# Patient Record
Sex: MALE | Race: BLACK OR AFRICAN AMERICAN | NOT HISPANIC OR LATINO | Employment: OTHER | URBAN - NONMETROPOLITAN AREA
[De-identification: names, ages, dates, MRNs, and addresses within clinical notes are randomized per-mention and may not be internally consistent; named-entity substitution may affect disease eponyms.]

---

## 2023-04-06 DIAGNOSIS — I10 BENIGN ESSENTIAL HYPERTENSION: ICD-10-CM

## 2023-04-06 DIAGNOSIS — E78.5 HYPERLIPIDEMIA, UNSPECIFIED HYPERLIPIDEMIA TYPE: ICD-10-CM

## 2023-04-06 PROBLEM — N32.89 BLADDER WALL THICKENING: Status: ACTIVE | Noted: 2023-04-06

## 2023-04-06 PROBLEM — J44.9 COPD (CHRONIC OBSTRUCTIVE PULMONARY DISEASE) (MULTI): Status: ACTIVE | Noted: 2023-04-06

## 2023-04-06 PROBLEM — M25.562 LEFT KNEE PAIN: Status: ACTIVE | Noted: 2023-04-06

## 2023-04-06 PROBLEM — R10.30 GROIN PAIN: Status: ACTIVE | Noted: 2023-04-06

## 2023-04-06 PROBLEM — G47.00 INSOMNIA: Status: ACTIVE | Noted: 2023-04-06

## 2023-04-06 PROBLEM — D17.79 EPIDURAL LIPOMATOSIS: Status: ACTIVE | Noted: 2023-04-06

## 2023-04-06 PROBLEM — R00.2 PALPITATIONS: Status: ACTIVE | Noted: 2023-04-06

## 2023-04-06 PROBLEM — N18.30 CKD (CHRONIC KIDNEY DISEASE) STAGE 3, GFR 30-59 ML/MIN (MULTI): Status: ACTIVE | Noted: 2023-04-06

## 2023-04-06 PROBLEM — M43.10 ACQUIRED SPONDYLOLISTHESIS: Status: ACTIVE | Noted: 2023-04-06

## 2023-04-06 PROBLEM — N13.8 BPH WITH OBSTRUCTION/LOWER URINARY TRACT SYMPTOMS: Status: ACTIVE | Noted: 2023-04-06

## 2023-04-06 PROBLEM — M17.9 OSTEOARTHRITIS OF KNEE: Status: ACTIVE | Noted: 2023-04-06

## 2023-04-06 PROBLEM — E04.1 THYROID NODULE: Status: ACTIVE | Noted: 2023-04-06

## 2023-04-06 PROBLEM — M54.50 LOW BACK PAIN: Status: ACTIVE | Noted: 2023-04-06

## 2023-04-06 PROBLEM — R10.2 PELVIC PAIN IN MALE: Status: ACTIVE | Noted: 2023-04-06

## 2023-04-06 PROBLEM — R32 URINARY INCONTINENCE: Status: ACTIVE | Noted: 2023-04-06

## 2023-04-06 PROBLEM — H47.013 ISCHEMIC OPTIC NEUROPATHY, BILATERAL: Status: ACTIVE | Noted: 2023-04-06

## 2023-04-06 PROBLEM — H04.123 BILATERAL DRY EYES: Status: ACTIVE | Noted: 2023-04-06

## 2023-04-06 PROBLEM — E88.2 EPIDURAL LIPOMATOSIS: Status: ACTIVE | Noted: 2023-04-06

## 2023-04-06 PROBLEM — I25.10 ATHEROSCLEROTIC HEART DISEASE OF NATIVE CORONARY ARTERY WITHOUT ANGINA PECTORIS: Status: ACTIVE | Noted: 2023-04-06

## 2023-04-06 PROBLEM — R19.4 CHANGE IN BOWEL HABITS: Status: ACTIVE | Noted: 2023-04-06

## 2023-04-06 PROBLEM — R91.1 LUNG NODULE: Status: ACTIVE | Noted: 2023-04-06

## 2023-04-06 PROBLEM — R53.81 MALAISE: Status: ACTIVE | Noted: 2023-04-06

## 2023-04-06 PROBLEM — M25.519 SHOULDER PAIN: Status: ACTIVE | Noted: 2023-04-06

## 2023-04-06 PROBLEM — C34.90 LUNG CANCER (MULTI): Status: ACTIVE | Noted: 2023-04-06

## 2023-04-06 PROBLEM — R06.83 SNORING: Status: ACTIVE | Noted: 2023-04-06

## 2023-04-06 PROBLEM — M51.36 DEGENERATIVE DISC DISEASE, LUMBAR: Status: ACTIVE | Noted: 2023-04-06

## 2023-04-06 PROBLEM — N52.9 ED (ERECTILE DYSFUNCTION): Status: ACTIVE | Noted: 2023-04-06

## 2023-04-06 PROBLEM — N39.41 URGE INCONTINENCE OF URINE: Status: ACTIVE | Noted: 2023-04-06

## 2023-04-06 PROBLEM — K59.09 CHRONIC CONSTIPATION: Status: ACTIVE | Noted: 2023-04-06

## 2023-04-06 PROBLEM — R73.01 IMPAIRED FASTING GLUCOSE: Status: ACTIVE | Noted: 2023-04-06

## 2023-04-06 PROBLEM — R31.9 HEMATURIA: Status: ACTIVE | Noted: 2023-04-06

## 2023-04-06 PROBLEM — G47.33 OBSTRUCTIVE SLEEP APNEA OF ADULT: Status: ACTIVE | Noted: 2023-04-06

## 2023-04-06 PROBLEM — M70.22 OLECRANON BURSITIS OF LEFT ELBOW: Status: ACTIVE | Noted: 2023-04-06

## 2023-04-06 PROBLEM — M25.511 PAIN IN JOINT OF RIGHT SHOULDER: Status: ACTIVE | Noted: 2023-04-06

## 2023-04-06 PROBLEM — N18.32 CHRONIC KIDNEY DISEASE, STAGE 3B (MULTI): Status: ACTIVE | Noted: 2023-04-06

## 2023-04-06 PROBLEM — R97.20 ELEVATED PSA: Status: ACTIVE | Noted: 2023-04-06

## 2023-04-06 PROBLEM — G47.30 SLEEP APNEA: Status: ACTIVE | Noted: 2023-04-06

## 2023-04-06 PROBLEM — G47.10 HYPERSOMNIA WITH SLEEP APNEA: Status: ACTIVE | Noted: 2023-04-06

## 2023-04-06 PROBLEM — K21.9 ESOPHAGEAL REFLUX: Status: ACTIVE | Noted: 2023-04-06

## 2023-04-06 PROBLEM — H25.13 NUCLEAR SCLEROSIS OF BOTH EYES: Status: ACTIVE | Noted: 2023-04-06

## 2023-04-06 PROBLEM — R39.9 URINARY SYMPTOM OR SIGN: Status: ACTIVE | Noted: 2023-04-06

## 2023-04-06 PROBLEM — R35.0 URINARY FREQUENCY: Status: ACTIVE | Noted: 2023-04-06

## 2023-04-06 PROBLEM — M51.369 DEGENERATIVE DISC DISEASE, LUMBAR: Status: ACTIVE | Noted: 2023-04-06

## 2023-04-06 PROBLEM — M17.12 PRIMARY OSTEOARTHRITIS OF LEFT KNEE: Status: ACTIVE | Noted: 2023-04-06

## 2023-04-06 PROBLEM — H40.10X3: Status: ACTIVE | Noted: 2023-04-06

## 2023-04-06 PROBLEM — N50.82 SCROTAL PAIN: Status: ACTIVE | Noted: 2023-04-06

## 2023-04-06 PROBLEM — M54.16 LUMBAR RADICULAR PAIN: Status: ACTIVE | Noted: 2023-04-06

## 2023-04-06 PROBLEM — K63.5 BENIGN COLONIC POLYP: Status: ACTIVE | Noted: 2023-04-06

## 2023-04-06 PROBLEM — N40.1 BPH WITH OBSTRUCTION/LOWER URINARY TRACT SYMPTOMS: Status: ACTIVE | Noted: 2023-04-06

## 2023-04-06 PROBLEM — M48.061 LUMBAR SPINAL STENOSIS: Status: ACTIVE | Noted: 2023-04-06

## 2023-04-06 PROBLEM — G47.30 HYPERSOMNIA WITH SLEEP APNEA: Status: ACTIVE | Noted: 2023-04-06

## 2023-04-06 RX ORDER — ATORVASTATIN CALCIUM 80 MG/1
TABLET, FILM COATED ORAL
Qty: 90 TABLET | Refills: 3 | Status: SHIPPED | OUTPATIENT
Start: 2023-04-06 | End: 2023-07-05

## 2023-04-06 RX ORDER — NEBIVOLOL 10 MG/1
TABLET ORAL
Qty: 90 TABLET | Refills: 1 | Status: SHIPPED | OUTPATIENT
Start: 2023-04-06 | End: 2023-08-19 | Stop reason: SDUPTHER

## 2023-07-11 DIAGNOSIS — I10 BENIGN ESSENTIAL HYPERTENSION: ICD-10-CM

## 2023-07-18 LAB
ALBUMIN (G/DL) IN SER/PLAS: 4.1 G/DL (ref 3.4–5)
ANION GAP IN SER/PLAS: 16 MMOL/L (ref 10–20)
APPEARANCE, URINE: CLEAR
BILIRUBIN, URINE: NEGATIVE
BLOOD, URINE: NEGATIVE
CALCIUM (MG/DL) IN SER/PLAS: 9 MG/DL (ref 8.6–10.6)
CARBON DIOXIDE, TOTAL (MMOL/L) IN SER/PLAS: 27 MMOL/L (ref 21–32)
CHLORIDE (MMOL/L) IN SER/PLAS: 103 MMOL/L (ref 98–107)
COLOR, URINE: YELLOW
CREATININE (MG/DL) IN SER/PLAS: 1.91 MG/DL (ref 0.5–1.3)
GFR MALE: 35 ML/MIN/1.73M2
GLUCOSE (MG/DL) IN SER/PLAS: 103 MG/DL (ref 74–99)
GLUCOSE, URINE: NEGATIVE MG/DL
KETONES, URINE: NEGATIVE MG/DL
LEUKOCYTE ESTERASE, URINE: NEGATIVE
NITRITE, URINE: NEGATIVE
PH, URINE: 7 (ref 5–8)
POTASSIUM (MMOL/L) IN SER/PLAS: 4 MMOL/L (ref 3.5–5.3)
PROTEIN, URINE: NEGATIVE MG/DL
SODIUM (MMOL/L) IN SER/PLAS: 142 MMOL/L (ref 136–145)
SPECIFIC GRAVITY, URINE: 1.01 (ref 1–1.03)
URATE (MG/DL) IN SER/PLAS: 8.3 MG/DL (ref 4–7.5)
UREA NITROGEN (MG/DL) IN SER/PLAS: 19 MG/DL (ref 6–23)
UROBILINOGEN, URINE: <2 MG/DL (ref 0–1.9)

## 2023-08-19 RX ORDER — NEBIVOLOL 10 MG/1
TABLET ORAL
Qty: 30 TABLET | Refills: 0 | Status: SHIPPED | OUTPATIENT
Start: 2023-08-19 | End: 2023-08-28

## 2023-08-21 DIAGNOSIS — I10 BENIGN ESSENTIAL HYPERTENSION: ICD-10-CM

## 2023-08-28 RX ORDER — NEBIVOLOL 10 MG/1
TABLET ORAL
Qty: 90 TABLET | Refills: 1 | Status: SHIPPED | OUTPATIENT
Start: 2023-08-28 | End: 2023-11-26

## 2023-10-06 ENCOUNTER — TELEPHONE (OUTPATIENT)
Dept: GASTROENTEROLOGY | Facility: HOSPITAL | Age: 78
End: 2023-10-06
Payer: COMMERCIAL

## 2023-10-25 PROBLEM — E79.0 HYPERURICEMIA: Status: ACTIVE | Noted: 2023-10-25

## 2023-10-25 RX ORDER — FLUTICASONE FUROATE, UMECLIDINIUM BROMIDE AND VILANTEROL TRIFENATATE 100; 62.5; 25 UG/1; UG/1; UG/1
1 POWDER RESPIRATORY (INHALATION) DAILY
COMMUNITY
Start: 2020-06-03

## 2023-10-25 RX ORDER — HYDROCHLOROTHIAZIDE 12.5 MG/1
1 TABLET ORAL DAILY
COMMUNITY

## 2023-10-25 RX ORDER — VIBEGRON 75 MG/1
1 TABLET, FILM COATED ORAL DAILY
COMMUNITY

## 2023-10-25 RX ORDER — FLUTICASONE PROPIONATE 50 MCG
SPRAY, SUSPENSION (ML) NASAL
COMMUNITY
Start: 2023-06-19

## 2023-10-25 RX ORDER — ASPIRIN 81 MG/1
1 TABLET ORAL DAILY
COMMUNITY

## 2023-10-25 RX ORDER — EZETIMIBE 10 MG/1
1 TABLET ORAL DAILY
COMMUNITY
Start: 2021-05-24

## 2023-10-25 RX ORDER — CYCLOBENZAPRINE HCL 10 MG
TABLET ORAL
COMMUNITY
Start: 2022-11-10

## 2023-10-25 RX ORDER — TAMSULOSIN HYDROCHLORIDE 0.4 MG/1
CAPSULE ORAL
COMMUNITY
Start: 2023-01-09

## 2023-10-25 RX ORDER — ALLOPURINOL 100 MG/1
1 TABLET ORAL DAILY
COMMUNITY
Start: 2023-07-26

## 2023-10-25 RX ORDER — LOSARTAN POTASSIUM 25 MG/1
TABLET ORAL
COMMUNITY
Start: 2023-04-11

## 2023-10-25 RX ORDER — OMEPRAZOLE 40 MG/1
1 CAPSULE, DELAYED RELEASE ORAL 2 TIMES DAILY
COMMUNITY
Start: 2013-09-17

## 2023-10-25 RX ORDER — ESOMEPRAZOLE MAGNESIUM 40 MG/1
CAPSULE, DELAYED RELEASE ORAL
COMMUNITY

## 2023-10-25 RX ORDER — DICYCLOMINE HYDROCHLORIDE 10 MG/1
CAPSULE ORAL
COMMUNITY
Start: 2023-06-09

## 2023-10-25 RX ORDER — LINACLOTIDE 145 UG/1
CAPSULE, GELATIN COATED ORAL
COMMUNITY
Start: 2023-09-16

## 2023-10-26 ENCOUNTER — HOSPITAL ENCOUNTER (OUTPATIENT)
Dept: RADIOLOGY | Facility: HOSPITAL | Age: 78
Discharge: HOME | End: 2023-10-26
Payer: MEDICARE

## 2023-10-26 ENCOUNTER — OFFICE VISIT (OUTPATIENT)
Dept: SURGERY | Facility: HOSPITAL | Age: 78
End: 2023-10-26
Payer: MEDICARE

## 2023-10-26 VITALS
RESPIRATION RATE: 17 BRPM | OXYGEN SATURATION: 98 % | HEART RATE: 65 BPM | TEMPERATURE: 97.5 F | DIASTOLIC BLOOD PRESSURE: 75 MMHG | SYSTOLIC BLOOD PRESSURE: 124 MMHG

## 2023-10-26 DIAGNOSIS — J43.9 PULMONARY EMPHYSEMA, UNSPECIFIED EMPHYSEMA TYPE (MULTI): Primary | ICD-10-CM

## 2023-10-26 DIAGNOSIS — C34.90 MALIGNANT NEOPLASM OF LUNG, UNSPECIFIED LATERALITY, UNSPECIFIED PART OF LUNG (MULTI): ICD-10-CM

## 2023-10-26 DIAGNOSIS — R91.1 LUNG NODULE: ICD-10-CM

## 2023-10-26 DIAGNOSIS — C34.90 MALIGNANT NEOPLASM OF UNSPECIFIED PART OF UNSPECIFIED BRONCHUS OR LUNG (MULTI): ICD-10-CM

## 2023-10-26 DIAGNOSIS — R91.1 SOLITARY PULMONARY NODULE: ICD-10-CM

## 2023-10-26 PROCEDURE — 1160F RVW MEDS BY RX/DR IN RCRD: CPT | Performed by: STUDENT IN AN ORGANIZED HEALTH CARE EDUCATION/TRAINING PROGRAM

## 2023-10-26 PROCEDURE — 3078F DIAST BP <80 MM HG: CPT | Performed by: STUDENT IN AN ORGANIZED HEALTH CARE EDUCATION/TRAINING PROGRAM

## 2023-10-26 PROCEDURE — 1159F MED LIST DOCD IN RCRD: CPT | Performed by: STUDENT IN AN ORGANIZED HEALTH CARE EDUCATION/TRAINING PROGRAM

## 2023-10-26 PROCEDURE — 99214 OFFICE O/P EST MOD 30 MIN: CPT | Performed by: STUDENT IN AN ORGANIZED HEALTH CARE EDUCATION/TRAINING PROGRAM

## 2023-10-26 PROCEDURE — 71250 CT THORAX DX C-: CPT | Performed by: RADIOLOGY

## 2023-10-26 PROCEDURE — 1126F AMNT PAIN NOTED NONE PRSNT: CPT | Performed by: STUDENT IN AN ORGANIZED HEALTH CARE EDUCATION/TRAINING PROGRAM

## 2023-10-26 PROCEDURE — 3074F SYST BP LT 130 MM HG: CPT | Performed by: STUDENT IN AN ORGANIZED HEALTH CARE EDUCATION/TRAINING PROGRAM

## 2023-10-26 PROCEDURE — 71250 CT THORAX DX C-: CPT

## 2023-10-26 ASSESSMENT — ENCOUNTER SYMPTOMS
DEPRESSION: 0
OCCASIONAL FEELINGS OF UNSTEADINESS: 0
LOSS OF SENSATION IN FEET: 0

## 2023-10-26 NOTE — PROGRESS NOTES
C/C:  Follow up visit    History Of Present Illness  Kody Conway is a 78 y.o. male presenting for follow up  with surveillance CT chest given h/o lung cancer and smoking hx. 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 Spends the majority of the year in [facility] but travels to [place] for ~5 months/year. Pt denies any significant change in his baseline SANTOS (mild; also has DUDLEY, not using CPAP).   Main complaint is abdominal pain, sees Dr. Hernández with GI (many years) who is following him    By way of review: underwent a RUL lobectomy on 1/27/2010 with Dr. Pak. In January 2020 he had some hemoptysis and repeat imaging at that time showed a GGO in the LLL.     Since last year he has had no recurrent hemoptysis or new cough, SOB, chest pain, or other. Denies unexplained weight loss.     Patient is a long-term smoker, has cut back significantly and no longer buys cigarettes for himself just occasionally requests one from friend or family     Past Medical History  He has a past medical history of Atherosclerotic heart disease of native coronary artery without angina pectoris (03/26/2013), Contusion of left elbow, initial encounter (07/29/2019), Disorder of kidney and ureter, unspecified (04/27/2018), Disturbances of salivary secretion (05/05/2017), Dyspnea, unspecified, Dyspnea, unspecified (02/28/2018), Edema, unspecified, Encounter for screening for malignant neoplasm of prostate (06/03/2020), Encounter for screening for malignant neoplasm of prostate (05/24/2021), Essential (primary) hypertension (03/26/2013), Fracture of one rib, unspecified side, initial encounter for closed fracture (07/02/2013), Hypocalcemia (07/01/2020), Male erectile dysfunction, unspecified (06/13/2018), Male erectile dysfunction, unspecified (03/26/2013), Malignant (primary) neoplasm, unspecified (CMS/HCC), Other chest pain (07/31/2019), Other conditions influencing health status, Other  Has a fever today, was told to call if over 100.4 it is at 100.8 per day care.    Mom also said that if over 100.4 she should go to the ER, so may not wait for return call today, this is why I put at high priority.    hypersomnia (05/05/2017), Other shoulder lesions, right shoulder (09/17/2020), Other specified diseases of anus and rectum (05/26/2017), Other specified diseases of intestine (05/19/2021), Other symptoms and signs involving the nervous system (07/29/2021), Other tear of medial meniscus, current injury, left knee, initial encounter (11/18/2015), Pain in right knee (07/31/2018), Personal history of diseases of the skin and subcutaneous tissue (10/02/2013), Personal history of other (healed) physical injury and trauma (10/29/2014), Personal history of other diseases of male genital organs (02/02/2015), Personal history of other diseases of male genital organs, Personal history of other diseases of male genital organs (07/07/2021), Personal history of other diseases of male genital organs (02/21/2022), Personal history of other diseases of the circulatory system, Personal history of other diseases of the circulatory system, Personal history of other diseases of the digestive system, Personal history of other diseases of the musculoskeletal system and connective tissue (08/14/2018), Personal history of other diseases of the respiratory system (06/03/2020), Personal history of other diseases of the respiratory system (06/03/2020), Personal history of other diseases of the respiratory system (03/01/2016), Personal history of other diseases of the respiratory system (09/14/2016), Personal history of other endocrine, nutritional and metabolic disease (02/25/2022), Personal history of other endocrine, nutritional and metabolic disease, Personal history of other specified conditions (07/19/2016), Personal history of other specified conditions (10/26/2015), Personal history of other specified conditions (01/29/2020), Personal history of other specified conditions (06/15/2021), Personal history of other specified conditions (04/27/2018), Personal history of other specified conditions (10/04/2017), Personal history of urinary  (tract) infections (10/27/2017), Radial styloid tenosynovitis (de quervain) (03/20/2018), Right lower quadrant pain (08/20/2018), Trigger finger, left middle finger (03/20/2018), Unspecified abdominal hernia without obstruction or gangrene (09/20/2013), and Unspecified visual loss.    Social History  He reports that he has quit smoking. His smoking use included cigarettes. He has been exposed to tobacco smoke. He has never used smokeless tobacco. He reports that he does not drink alcohol and does not use drugs.      Medications    Current Outpatient Medications:     allopurinol (Zyloprim) 100 mg tablet, Take 1 tablet (100 mg) by mouth once daily., Disp: , Rfl:     aspirin 81 mg EC tablet, Take 1 tablet (81 mg) by mouth once daily., Disp: , Rfl:     atorvastatin (Lipitor) 80 mg tablet, TAKE 1 TABLET BY MOUTH DAILY, Disp: 90 tablet, Rfl: 3    cyclobenzaprine (Flexeril) 10 mg tablet, 1 tab(s) orally 3 times a day, Disp: , Rfl:     dicyclomine (Bentyl) 10 mg capsule, , Disp: , Rfl:     esomeprazole (NexIUM) 40 mg DR capsule, 1 cap(s) orally once a day, Disp: , Rfl:     ezetimibe (Zetia) 10 mg tablet, Take 1 tablet (10 mg) by mouth once daily., Disp: , Rfl:     fluticasone (Flonase) 50 mcg/actuation nasal spray, , Disp: , Rfl:     Gemtesa 75 mg tablet, Take 1 tablet (75 mg) by mouth once daily., Disp: , Rfl:     hydroCHLOROthiazide (HYDRODiuril) 12.5 mg tablet, Take 1 tablet (12.5 mg) by mouth once daily., Disp: , Rfl:     Linzess 145 mcg capsule, , Disp: , Rfl:     losartan (Cozaar) 25 mg tablet, , Disp: , Rfl:     nebivolol (Bystolic) 10 mg tablet, TAKE 1 TABLET BY MOUTH EVERY DAY, Disp: 90 tablet, Rfl: 1    nebivolol (Bystolic) 10 mg tablet, TAKE 1 TABLET BY MOUTH EVERY DAY, Disp: 90 tablet, Rfl: 1    omeprazole (PriLOSEC) 40 mg DR capsule, Take 1 capsule (40 mg) by mouth 2 times a day., Disp: , Rfl:     tamsulosin (Flomax) 0.4 mg 24 hr capsule, , Disp: , Rfl:     Ammy Arias 100-62.5-25 mcg blister with device,  Inhale 1 puff once daily., Disp: , Rfl:     Allergies  Ciprofloxacin    Review of Systems:  Review of Systems   Constitutional: No fevers, chills, unexpected weight change  HENT: No sore throat, congestion, or nasal drainage  Eyes: No visual changes or eye itching  Respiratory:  see HPI. No cough, worsening dyspnea, wheezing  Cardiac: No chest pain, palpitations, or lower extremity edema  Gastrointestinal: No nausea, vomiting, diarrhea. No abdominal pain  Genitourinary: No dysuria or hematuria  Musculoskeletal: No back pain. No significant myalgias or arthralgias  Neurologic: No headaches, dizziness, or seizures.  Hematologic: No east bleeding or bruising.  Psychiatric: No anxiety or depression.    Physical Exam:  Physical Exam  /75   Pulse 65   Temp 36.4 °C (97.5 °F)   Resp 17   SpO2 98%   Constitutional:       General: Patient is not in acute distress.     Appearance: Normal appearance; not ill-appearing.   HENT:      Head: Normocephalic.      Nose: No congestion or rhinorrhea.   Cardiovascular:      Rate and Rhythm: Normal rate and regular rhythm.      Pulses: Normal pulses.   Pulmonary:      Effort: Pulmonary effort is normal. No respiratory distress.  No conversational dyspnea     Breath sounds: No stridor. No wheezing.   Abdominal:      General: There is no distension.      Palpations: Abdomen is soft.      Tenderness: There is no abdominal tenderness.   Musculoskeletal:         General: No swelling, tenderness or deformity. Normal range of motion.      Cervical back: Normal range of motion. No rigidity.   Lymphadenopathy:      Cervical: No cervical adenopathy.   Skin:     General: Skin is warm and dry.   Neurological:      General: No focal deficit present.      Mental Status: Patient is alert and oriented to person, place, and time.   Psychiatric:         Mood and Affect: Mood normal.       Relevant Results:     Imaging:    CT chest wo IV contrast    Result Date: 10/26/2023  Interpreted By:  Prosper  Eren Myers, STUDY: CT CHEST WO IV CONTRAST;  10/26/2023 1:13 pm   INDICATION: Signs/Symptoms: 1 year follow up surveilllance CT  C34.90: Lung cancer R91.1: Lung nodule.   COMPARISON: CT dated 11/03/2022   ACCESSION NUMBER(S): [id number]   ORDERING CLINICIAN: CONCHIS FLEMING   TECHNIQUE: Helical data acquisition of the chest was obtained  without IV contrast material.  Images were reformatted in axial, coronal, and sagittal planes.   FINDINGS: LUNGS AND AIRWAYS: The trachea and central airways are patent. No endobronchial lesion.   Redemonstration of postsurgical changes related to right upper lobectomy.   There is moderate upper lung predominant emphysema.   MEDIASTINUM AND AUBREY, LOWER NECK AND AXILLA: Heterogeneous appearance of the thyroid gland with suggestion of multiple small nodules measuring up to 1.6 cm. This appearance is grossly unchanged compared to prior study.   No evidence of thoracic lymphadenopathy by CT criteria. Calcified lymph nodes are identified in the mediastinum and right lung hilum.   Esophagus appears within normal limits as seen.   HEART AND VESSELS: The thoracic aorta is of normal course and caliber with mild vascular calcifications.   Main pulmonary artery and its branches are normal in caliber.   Severe coronary artery calcification. The study is not optimized for evaluation of coronary arteries.   The cardiac chambers are not enlarged. Calcification of the aortic valve.   No evidence of pericardial effusion.   UPPER ABDOMEN: Multiple hypodense lesions are identified throughout the liver including one of the hypodense lesion the junction of the right and left hepatic lobe demonstrating coarse peripheral calcification, unchanged compared to prior study.   CHEST WALL AND OSSEOUS STRUCTURES: There are no suspicious osseous lesions. Multilevel degenerative changes are present       1.  Postsurgical changes related to right upper lobectomy with no definite evidence of tumor  "recurrence or metastatic disease in the chest 2. Moderate upper lung predominant emphysema. 3. Severe coronary artery calcification. Correlation with coronary artery disease risk factors. 4. Aortic valve calcification. Correlation with aortic valve stenosis. 5. Stable upper abdominal findings as described above.   MACRO: None   Signed by: Eren Myers 10/26/2023 1:28 PM Dictation workstation:   [id number]       Pulmonary Functions Testing Results:    No results found for: \"FEV1\", \"FVC\", \"HMT9CDQ\", \"TLC\", \"DLCO\"    CT Chest was personally reviewed     Assessment/Plan   Diagnoses and all orders for this visit:  Pulmonary emphysema, unspecified emphysema type (CMS/HCC)  Malignant neoplasm of lung, unspecified laterality, unspecified part of lung (CMS/HCC)  -     CT chest wo IV contrast; Future  Lung nodule  -     CT chest wo IV contrast; Future         Kody Conway is a 78 y.o. male s/p RULx in 2010 for lung cancer, long term prior smoker. Surveillance imaging today showed no evidence of disease. He is doing well from a respiratory standpoint. Quite robust for his age. Plan is repeat CT chest in 1 year. .      Shannon Schmidt, DO  Thoracic & Esophageal Surgery     "

## 2024-09-17 ENCOUNTER — APPOINTMENT (OUTPATIENT)
Dept: PRIMARY CARE | Facility: CLINIC | Age: 79
End: 2024-09-17
Payer: MEDICARE

## 2024-09-17 VITALS
WEIGHT: 204 LBS | HEART RATE: 76 BPM | BODY MASS INDEX: 29.2 KG/M2 | HEIGHT: 70 IN | DIASTOLIC BLOOD PRESSURE: 80 MMHG | SYSTOLIC BLOOD PRESSURE: 130 MMHG

## 2024-09-17 DIAGNOSIS — I25.10 ATHEROSCLEROSIS OF NATIVE CORONARY ARTERY WITHOUT ANGINA PECTORIS, UNSPECIFIED WHETHER NATIVE OR TRANSPLANTED HEART: ICD-10-CM

## 2024-09-17 DIAGNOSIS — J43.9 PULMONARY EMPHYSEMA, UNSPECIFIED EMPHYSEMA TYPE (MULTI): ICD-10-CM

## 2024-09-17 DIAGNOSIS — N18.32 CHRONIC KIDNEY DISEASE, STAGE 3B (MULTI): ICD-10-CM

## 2024-09-17 DIAGNOSIS — E78.2 MIXED HYPERLIPIDEMIA: ICD-10-CM

## 2024-09-17 DIAGNOSIS — C34.90 MALIGNANT NEOPLASM OF LUNG, UNSPECIFIED LATERALITY, UNSPECIFIED PART OF LUNG (MULTI): ICD-10-CM

## 2024-09-17 DIAGNOSIS — M54.41 CHRONIC RIGHT-SIDED LOW BACK PAIN WITH RIGHT-SIDED SCIATICA: ICD-10-CM

## 2024-09-17 DIAGNOSIS — J43.9 PULMONARY EMPHYSEMA, UNSPECIFIED EMPHYSEMA TYPE (MULTI): Primary | ICD-10-CM

## 2024-09-17 DIAGNOSIS — E11.22 TYPE 2 DIABETES MELLITUS WITH CHRONIC KIDNEY DISEASE, WITHOUT LONG-TERM CURRENT USE OF INSULIN, UNSPECIFIED CKD STAGE (MULTI): ICD-10-CM

## 2024-09-17 DIAGNOSIS — G89.29 CHRONIC RIGHT-SIDED LOW BACK PAIN WITH RIGHT-SIDED SCIATICA: ICD-10-CM

## 2024-09-17 DIAGNOSIS — I10 BENIGN ESSENTIAL HYPERTENSION: ICD-10-CM

## 2024-09-17 DIAGNOSIS — K59.09 CHRONIC CONSTIPATION: ICD-10-CM

## 2024-09-17 DIAGNOSIS — Z23 NEED FOR INFLUENZA VACCINATION: Primary | ICD-10-CM

## 2024-09-17 PROCEDURE — 1170F FXNL STATUS ASSESSED: CPT | Performed by: INTERNAL MEDICINE

## 2024-09-17 PROCEDURE — 3075F SYST BP GE 130 - 139MM HG: CPT | Performed by: INTERNAL MEDICINE

## 2024-09-17 PROCEDURE — 1159F MED LIST DOCD IN RCRD: CPT | Performed by: INTERNAL MEDICINE

## 2024-09-17 PROCEDURE — 1160F RVW MEDS BY RX/DR IN RCRD: CPT | Performed by: INTERNAL MEDICINE

## 2024-09-17 PROCEDURE — 3079F DIAST BP 80-89 MM HG: CPT | Performed by: INTERNAL MEDICINE

## 2024-09-17 PROCEDURE — G0439 PPPS, SUBSEQ VISIT: HCPCS | Performed by: INTERNAL MEDICINE

## 2024-09-17 PROCEDURE — 90656 IIV3 VACC NO PRSV 0.5 ML IM: CPT | Performed by: INTERNAL MEDICINE

## 2024-09-17 PROCEDURE — 99214 OFFICE O/P EST MOD 30 MIN: CPT | Performed by: INTERNAL MEDICINE

## 2024-09-17 PROCEDURE — G0008 ADMIN INFLUENZA VIRUS VAC: HCPCS | Performed by: INTERNAL MEDICINE

## 2024-09-17 RX ORDER — FLUTICASONE FUROATE, UMECLIDINIUM BROMIDE AND VILANTEROL TRIFENATATE 100; 62.5; 25 UG/1; UG/1; UG/1
1 POWDER RESPIRATORY (INHALATION) DAILY
Qty: 1 EACH | Refills: 6 | Status: SHIPPED | OUTPATIENT
Start: 2024-09-17

## 2024-09-17 RX ORDER — METHYLPREDNISOLONE 4 MG/1
TABLET ORAL
Qty: 21 TABLET | Refills: 0 | Status: SHIPPED | OUTPATIENT
Start: 2024-09-17 | End: 2024-09-24

## 2024-09-17 ASSESSMENT — ENCOUNTER SYMPTOMS
POLYDIPSIA: 0
BLOOD IN STOOL: 0
SINUS PAIN: 0
DIAPHORESIS: 0
CONSTIPATION: 1
CHOKING: 0
CHILLS: 0
EYE REDNESS: 0
ABDOMINAL PAIN: 1
CHEST TIGHTNESS: 0
ARTHRALGIAS: 0
ACTIVITY CHANGE: 0
RHINORRHEA: 0
SINUS PRESSURE: 0
NECK STIFFNESS: 0
NUMBNESS: 0
DIZZINESS: 0
VOMITING: 0
SPEECH DIFFICULTY: 0
STRIDOR: 0
COLOR CHANGE: 0
JOINT SWELLING: 0
BACK PAIN: 1
FATIGUE: 0
TROUBLE SWALLOWING: 0
FLANK PAIN: 0
ADENOPATHY: 0
FACIAL ASYMMETRY: 0
ABDOMINAL DISTENTION: 0
SHORTNESS OF BREATH: 1
COUGH: 0
SEIZURES: 0
WEAKNESS: 0
SORE THROAT: 0
BRUISES/BLEEDS EASILY: 0
RECTAL PAIN: 0
HEADACHES: 1
DIFFICULTY URINATING: 0
ANAL BLEEDING: 0
DYSURIA: 0
HEMATURIA: 0
WOUND: 0
SLEEP DISTURBANCE: 0
VOICE CHANGE: 0
EYE ITCHING: 0
PHOTOPHOBIA: 0
WHEEZING: 0
DIARRHEA: 0
TREMORS: 0
POLYPHAGIA: 0
NAUSEA: 0
EYE DISCHARGE: 0
PALPITATIONS: 0
EYE PAIN: 0
NECK PAIN: 0
APPETITE CHANGE: 0
FREQUENCY: 1
LIGHT-HEADEDNESS: 0
MYALGIAS: 0
FACIAL SWELLING: 0

## 2024-09-17 ASSESSMENT — ACTIVITIES OF DAILY LIVING (ADL)
DOING_HOUSEWORK: INDEPENDENT
GROCERY_SHOPPING: INDEPENDENT
TAKING_MEDICATION: INDEPENDENT
MANAGING_FINANCES: INDEPENDENT
DRESSING: INDEPENDENT
BATHING: INDEPENDENT

## 2024-09-17 ASSESSMENT — PATIENT HEALTH QUESTIONNAIRE - PHQ9
SUM OF ALL RESPONSES TO PHQ9 QUESTIONS 1 AND 2: 0
1. LITTLE INTEREST OR PLEASURE IN DOING THINGS: NOT AT ALL
2. FEELING DOWN, DEPRESSED OR HOPELESS: NOT AT ALL

## 2024-09-17 NOTE — PATIENT INSTRUCTIONS
Please take medication as prescribed.  Schedule physical therapy.  Call if not better.  Signed a medical release of information for all your tests.

## 2024-09-17 NOTE — PROGRESS NOTES
Subjective   Patient ID: Kody Conway is a 79 y.o. male who presents for Medicare Annual Wellness Visit Subsequent.    Patient presents for wellness exam and follow-up.  I have not seen him in over 1 year.  He is living in [place]..  He is seeing a cardiologist, internist and pulmonologist.  He is also has a urologist.  He has been complaining of low back pain with radiation down his right leg since his drive up from [place].  He denies any weakness in his legs.  He denies any headaches, no dizziness, no chest pain but reports baseline dyspnea on exertion.  He reports baseline abdominal pain and constipation.  He denies any nausea or vomiting.  Reports baseline urinary frequency.         Review of Systems   Constitutional:  Negative for activity change, appetite change, chills, diaphoresis and fatigue.   HENT:  Negative for congestion, dental problem, drooling, ear discharge, ear pain, facial swelling, hearing loss, mouth sores, nosebleeds, postnasal drip, rhinorrhea, sinus pressure, sinus pain, sneezing, sore throat, tinnitus, trouble swallowing and voice change.    Eyes:  Negative for photophobia, pain, discharge, redness, itching and visual disturbance.   Respiratory:  Positive for shortness of breath. Negative for cough, choking, chest tightness, wheezing and stridor.    Cardiovascular:  Negative for chest pain, palpitations and leg swelling.   Gastrointestinal:  Positive for abdominal pain and constipation. Negative for abdominal distention, anal bleeding, blood in stool, diarrhea, nausea, rectal pain and vomiting.   Endocrine: Negative for cold intolerance, heat intolerance, polydipsia, polyphagia and polyuria.   Genitourinary:  Positive for frequency. Negative for decreased urine volume, difficulty urinating, dysuria, enuresis, flank pain, genital sores, hematuria and urgency.   Musculoskeletal:  Positive for back pain. Negative for arthralgias, gait problem, joint swelling, myalgias, neck pain  "and neck stiffness.   Skin:  Negative for color change, pallor, rash and wound.   Neurological:  Positive for headaches. Negative for dizziness, tremors, seizures, syncope, facial asymmetry, speech difficulty, weakness, light-headedness and numbness.   Hematological:  Negative for adenopathy. Does not bruise/bleed easily.   Psychiatric/Behavioral:  Negative for sleep disturbance.        Objective   /80   Pulse 76   Ht 1.778 m (5' 10\")   Wt 92.5 kg (204 lb)   BMI 29.27 kg/m²     Physical Exam  Constitutional:       Appearance: Normal appearance.   Cardiovascular:      Rate and Rhythm: Normal rate and regular rhythm.      Heart sounds: No murmur heard.     No gallop.   Pulmonary:      Effort: No respiratory distress.      Breath sounds: No wheezing or rales.   Abdominal:      General: There is no distension.      Palpations: There is no mass.      Tenderness: There is no abdominal tenderness. There is no guarding.   Musculoskeletal:      Right lower leg: No edema.      Left lower leg: No edema.   Neurological:      Mental Status: He is alert.         Assessment/Plan   Diagnoses and all orders for this visit:  Need for influenza vaccination  -     Flu vaccine, trivalent, preservative free, age 6 months and greater (Fluarix/Fluzone/Flulaval)  Type 2 diabetes mellitus with chronic kidney disease, without long-term current use of insulin, unspecified CKD stage (Multi)-he will diet and exercise.  Monitor sugar closely on steroids  Malignant neoplasm of lung, unspecified laterality, unspecified part of lung (Multi)-follow-up with surgery clinic and pulmonary  Pulmonary emphysema, unspecified emphysema type (Multi)-follow-up with pulmonary  Chronic kidney disease, stage 3b (Multi)-he is seeing a nephrologist [facility]  Mixed hyperlipidemia  Benign essential hypertension-stable on present medication  Atherosclerosis of native coronary artery without angina pectoris, unspecified whether native or transplanted " heart-modify risk factors.  Follow-up with cardiology  Chronic constipation-symptoms are improved  Chronic right-sided low back pain with right-sided sciatica-will refer to physical therapy.  Will treat with a Medrol Dosepak.  -     Referral to Physical Therapy; Future  -     methylPREDNISolone (Medrol Dospak) 4 mg tablets; Take as directed on package.  Health maintenance-will give a flu shot today.  He will get the COVID and RSV vaccine at the pharmacy.  Colonoscopy has been done.  Eye and dental appointments have been done.

## 2024-10-14 ENCOUNTER — EVALUATION (OUTPATIENT)
Dept: PHYSICAL THERAPY | Facility: CLINIC | Age: 79
End: 2024-10-14
Payer: MEDICARE

## 2024-10-14 DIAGNOSIS — M54.41 CHRONIC RIGHT-SIDED LOW BACK PAIN WITH RIGHT-SIDED SCIATICA: ICD-10-CM

## 2024-10-14 DIAGNOSIS — G89.29 CHRONIC RIGHT-SIDED LOW BACK PAIN WITH RIGHT-SIDED SCIATICA: ICD-10-CM

## 2024-10-14 PROCEDURE — 97110 THERAPEUTIC EXERCISES: CPT | Mod: GP | Performed by: PHYSICAL THERAPIST

## 2024-10-14 PROCEDURE — 97161 PT EVAL LOW COMPLEX 20 MIN: CPT | Mod: GP | Performed by: PHYSICAL THERAPIST

## 2024-10-14 ASSESSMENT — ENCOUNTER SYMPTOMS
OCCASIONAL FEELINGS OF UNSTEADINESS: 1
DEPRESSION: 0
LOSS OF SENSATION IN FEET: 1

## 2024-10-14 NOTE — PROGRESS NOTES
Physical Therapy    Physical Therapy Evaluation and Treatment      Patient Name: Kody Conway  MRN: [id number]  Today's Date: 10/14/2024  Visit: 1  Insurance: Reviewed  Physician: Jesse Mata  Problem List Items Addressed This Visit             ICD-10-CM    Low back pain M54.50    Relevant Orders    Follow Up In Physical Therapy        Time Entry:   Time Calculation  Start Time: 0750  Stop Time: 0835  Time Calculation (min): 45 min  PT Evaluation Time Entry  PT Evaluation (Low) Time Entry: 25  PT Therapeutic Procedures Time Entry  Therapeutic Exercise Time Entry: 10  PT Modalities Time Entry  Hot/Cold Pack Time Entry: 10                Assessment: Patient seen in PT for Initial Evaluation for back and leg pain.   Patient presents with postural deviation  decreased lumbar  ROM , decreased core strength, no back tenderness, hip stiffness and weakness.  Functionally, patient  unable to to do physical work.  Patient rates oswestry at 24%.    PT Assessment  Rehab Prognosis: Good  Evaluation/Treatment Tolerance: Patient tolerated treatment well     Plan:  Continue with POC  SciFit stepper, back flexion exercises, hip stretches, core strength, hip strength, posture/body mechanics, HP  OP PT Plan  PT Plan: Skilled PT  PT Frequency: 1 time per week  Duration: 6  Onset Date: 09/17/24  Certification Period Start Date: 10/14/24  Certification Period End Date: 01/12/25  Rehab Potential: Good  Plan of Care Agreement: Patient    Current Problem:   1. Chronic right-sided low back pain with right-sided sciatica  Referral to Physical Therapy    Follow Up In Physical Therapy          Subjective    General: Patient with a history of back and leg pain for 2 months.  Onset was insidious.  Patient treated with exercises - airdyne, stretches, light weights, tyelonol.  Patient complains of pain in the region of the ls junction down right pl leg, sharp pain, 8/10 at worst last 7 days.  Patient's pain is worse with am, bending over, lift.   Functionally, patient is unable to walk distances, lift, yard work.  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  abdominal strength to fair and back extensor strength to unable to assess.  LE ROM: hip flex 120, ext 0, ir 15, er 40 on right WNL on left  LE strength: 4/5 right hip strength  Non-tender to palpation at the lumbar spine    Outcome Measures:  Other Measures  Oswestry Disablity Index (ROSALINE): 12     Treatments:  Patient instructed in HEP including: KTC, HLR, PT, active hamstring stretch and supine arm and opposite leg lift  times each.  (10 minutes)  HP to the lumbar region 10 minutes      EDUCATION: HEP       Goals:  Active       PT Problem       PT Goal 1       Start:  10/14/24    Expected End:  01/12/25       Back pain 4/10 or less         PT Goal 2       Start:  10/14/24    Expected End:  01/12/25       Oswestry improve by 10%         PT Goal 3       Start:  10/14/24    Expected End:  01/12/25       Maximize hip and lumbar ROM          PT Goal 4       Start:  10/14/24    Expected End:  01/12/25       5/5 hip strength  Good abdominal strength

## 2024-10-17 ENCOUNTER — HOSPITAL ENCOUNTER (OUTPATIENT)
Dept: RADIOLOGY | Facility: HOSPITAL | Age: 79
Discharge: HOME | End: 2024-10-17
Payer: MEDICARE

## 2024-10-17 DIAGNOSIS — C34.90 MALIGNANT NEOPLASM OF LUNG, UNSPECIFIED LATERALITY, UNSPECIFIED PART OF LUNG (MULTI): ICD-10-CM

## 2024-10-17 DIAGNOSIS — R91.1 LUNG NODULE: ICD-10-CM

## 2024-10-17 PROCEDURE — 71250 CT THORAX DX C-: CPT

## 2024-10-21 ENCOUNTER — APPOINTMENT (OUTPATIENT)
Dept: PHYSICAL THERAPY | Facility: CLINIC | Age: 79
End: 2024-10-21
Payer: MEDICARE

## 2024-10-24 ENCOUNTER — TELEPHONE (OUTPATIENT)
Dept: GASTROENTEROLOGY | Facility: HOSPITAL | Age: 79
End: 2024-10-24
Payer: COMMERCIAL

## 2024-10-24 ENCOUNTER — TELEMEDICINE (OUTPATIENT)
Dept: SURGERY | Facility: HOSPITAL | Age: 79
End: 2024-10-24
Payer: MEDICARE

## 2024-10-24 DIAGNOSIS — R13.19 ESOPHAGEAL DYSPHAGIA: ICD-10-CM

## 2024-10-24 DIAGNOSIS — R13.10 DYSPHAGIA, UNSPECIFIED TYPE: Primary | ICD-10-CM

## 2024-10-24 DIAGNOSIS — C34.11 MALIGNANT NEOPLASM OF UPPER LOBE OF RIGHT LUNG (MULTI): Primary | ICD-10-CM

## 2024-10-24 PROCEDURE — 99443 PR PHYS/QHP TELEPHONE EVALUATION 21-30 MIN: CPT | Performed by: STUDENT IN AN ORGANIZED HEALTH CARE EDUCATION/TRAINING PROGRAM

## 2024-10-24 NOTE — TELEPHONE ENCOUNTER
Patient called in stating he is having abdominal pain and wants to get in for an appointment, however first available is January 2025. Patient would like a call back from Dr. Hernández to see what he needs to do for pain.

## 2024-10-25 ENCOUNTER — APPOINTMENT (OUTPATIENT)
Dept: RADIOLOGY | Facility: HOSPITAL | Age: 79
End: 2024-10-25
Payer: MEDICARE

## 2024-10-28 NOTE — H&P (VIEW-ONLY)
C/C:  Follow up visit    Virtual, phone call; patient missed in person appointment but was agreeable to phone visit to discuss imaging and care plan    History Of Present Illness  Kody Conway is a 79 y.o. male presenting for follow up  with surveillance CT chest given h/o lung cancer and smoking hx.       He is quite robust for his age - still working. Spends the majority of the year in [facility] but travels to [place] for ~5 months/year. Pt denies any significant change in his baseline SANTOS (mild; also has DUDLEY, not using CPAP).     His main complaint today is dysphagia which he reports started several weeks ago.  Patient reports this is primarily to solid foods but sometimes he has difficulty with liquids as well.  Denies significant unexplained weight loss.  He is followed by Dr. Hernández with GI (many years) who is following him for abdominal pain.    By way of review: Patient underwent a right upper lobectomy on 1/27/2010 with Dr. Kincaid.  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  He has been followed for this reason with surveillance imaging.  He has a heavy smoking history, Still smokes a cigarette on occasion..    Past Medical History  He has a past medical history of Atherosclerotic heart disease of native coronary artery without angina pectoris (03/26/2013), Contusion of left elbow, initial encounter (07/29/2019), Disorder of kidney and ureter, unspecified (04/27/2018), Disturbances of salivary secretion (05/05/2017), Dyspnea, unspecified, Dyspnea, unspecified (02/28/2018), Edema, unspecified, Encounter for screening for malignant neoplasm of prostate (06/03/2020), Encounter for screening for malignant neoplasm of prostate (05/24/2021), Essential (primary) hypertension (03/26/2013), Fracture of one rib, unspecified side, initial encounter for closed fracture (07/02/2013), Hypocalcemia (07/01/2020), Male erectile dysfunction,  unspecified (06/13/2018), Male erectile dysfunction, unspecified (03/26/2013), Malignant (primary) neoplasm, unspecified (Multi), Other chest pain (07/31/2019), Other conditions influencing health status, Other hypersomnia (05/05/2017), Other shoulder lesions, right shoulder (09/17/2020), Other specified diseases of anus and rectum (05/26/2017), Other specified diseases of intestine (05/19/2021), Other symptoms and signs involving the nervous system (07/29/2021), Other tear of medial meniscus, current injury, left knee, initial encounter (11/18/2015), Pain in right knee (07/31/2018), Personal history of diseases of the skin and subcutaneous tissue (10/02/2013), Personal history of other (healed) physical injury and trauma (10/29/2014), Personal history of other diseases of male genital organs (02/02/2015), Personal history of other diseases of male genital organs, Personal history of other diseases of male genital organs (07/07/2021), Personal history of other diseases of male genital organs (02/21/2022), Personal history of other diseases of the circulatory system, Personal history of other diseases of the circulatory system, Personal history of other diseases of the digestive system, Personal history of other diseases of the musculoskeletal system and connective tissue (08/14/2018), Personal history of other diseases of the respiratory system (06/03/2020), Personal history of other diseases of the respiratory system (06/03/2020), Personal history of other diseases of the respiratory system (03/01/2016), Personal history of other diseases of the respiratory system (09/14/2016), Personal history of other endocrine, nutritional and metabolic disease (02/25/2022), Personal history of other endocrine, nutritional and metabolic disease, Personal history of other specified conditions (07/19/2016), Personal history of other specified conditions (10/26/2015), Personal history of other specified conditions (01/29/2020),  Personal history of other specified conditions (06/15/2021), Personal history of other specified conditions (04/27/2018), Personal history of other specified conditions (10/04/2017), Personal history of urinary (tract) infections (10/27/2017), Radial styloid tenosynovitis (de quervain) (03/20/2018), Right lower quadrant pain (08/20/2018), Trigger finger, left middle finger (03/20/2018), Unspecified abdominal hernia without obstruction or gangrene (09/20/2013), and Unspecified visual loss.    Social History  He reports that he has quit smoking. His smoking use included cigarettes. He has been exposed to tobacco smoke. He has never used smokeless tobacco. He reports that he does not drink alcohol and does not use drugs.      Medications    Current Outpatient Medications:     allopurinol (Zyloprim) 100 mg tablet, Take 1 tablet (100 mg) by mouth once daily., Disp: , Rfl:     aspirin 81 mg EC tablet, Take 1 tablet (81 mg) by mouth once daily., Disp: , Rfl:     atorvastatin (Lipitor) 80 mg tablet, TAKE 1 TABLET BY MOUTH DAILY, Disp: 90 tablet, Rfl: 3    cyclobenzaprine (Flexeril) 10 mg tablet, 1 tab(s) orally 3 times a day, Disp: , Rfl:     dicyclomine (Bentyl) 10 mg capsule, , Disp: , Rfl:     esomeprazole (NexIUM) 40 mg DR capsule, 1 cap(s) orally once a day, Disp: , Rfl:     ezetimibe (Zetia) 10 mg tablet, Take 1 tablet (10 mg) by mouth once daily., Disp: , Rfl:     fluticasone (Flonase) 50 mcg/actuation nasal spray, , Disp: , Rfl:     Gemtesa 75 mg tablet, Take 1 tablet (75 mg) by mouth once daily., Disp: , Rfl:     hydroCHLOROthiazide (HYDRODiuril) 12.5 mg tablet, Take 1 tablet (12.5 mg) by mouth once daily., Disp: , Rfl:     Linzess 145 mcg capsule, , Disp: , Rfl:     losartan (Cozaar) 25 mg tablet, , Disp: , Rfl:     nebivolol (Bystolic) 10 mg tablet, TAKE 1 TABLET BY MOUTH EVERY DAY, Disp: 90 tablet, Rfl: 1    nebivolol (Bystolic) 10 mg tablet, TAKE 1 TABLET BY MOUTH EVERY DAY, Disp: 90 tablet, Rfl: 1     omeprazole (PriLOSEC) 40 mg DR capsule, Take 1 capsule (40 mg) by mouth 2 times a day., Disp: , Rfl:     tamsulosin (Flomax) 0.4 mg 24 hr capsule, , Disp: , Rfl:     Trelegy Ellipta 100-62.5-25 mcg blister with device, Inhale 1 puff once daily., Disp: 1 each, Rfl: 6    Allergies  Ciprofloxacin    Review of Systems:  Review of Systems   Constitutional: No fevers, chills, unexpected weight change  HENT: No sore throat, congestion, or nasal drainage  Eyes: No visual changes or eye itching  Respiratory:  see HPI. No cough, worsening dyspnea, wheezing  Cardiac: No chest pain, palpitations, or lower extremity edema  Gastrointestinal: No nausea, vomiting, diarrhea. No abdominal pain  Genitourinary: No dysuria or hematuria  Musculoskeletal: No back pain. No significant myalgias or arthralgias  Neurologic: No headaches, dizziness, or seizures.  Hematologic: No east bleeding or bruising.  Psychiatric: No anxiety or depression.    Physical Exam:  Physical Exam  There were no vitals taken for this visit.  Neurological:      General: No focal deficit present.      Mental Status: Patient is alert and oriented to person, place, and time.   Psychiatric:         Mood and Affect: Mood normal.       Relevant Results:     Imaging:    === 10/17/24 ===    CT CHEST WO IV CONTRAST    - Impression -  1.  Stable postsurgical changes of right upper lobectomy without  evidence of local recurrence or new metastatic disease.  2. Moderate apically predominant centrilobular emphysema.  3. Severe coronary artery calcifications. Please note the study is  not optimized for evaluation of the coronary arteries.  4. Prominent aortic valve calcifications, correlate with concern for  aortic stenosis.  5. Additional findings as above.    I personally reviewed the image(s)/study and resident interpretation.  I agree with the findings as stated by resident Gabriel Hampton.  Data analyzed and images interpreted at [facility]  "[place], [place].    MACRO:  None    Signed by: Darrick Ford 10/17/2024 4:31 PM  Dictation workstation:   [id number]         Pulmonary Functions Testing Results:    No results found for: \"FEV1\", \"FVC\", \"DBM1VYB\", \"TLC\", \"DLCO\"    CT Chest was personally reviewed     Assessment/Plan   Diagnoses and all orders for this visit:  Malignant neoplasm of upper lobe of right lung (Multi)  Esophageal dysphagia      Kody Conway is a 79 y.o. male s/p RULx in 2010 for lung cancer, long term prior smoker. Surveillance imaging today showed no evidence of disease. He is doing well from a respiratory standpoint and recent CT chest shows no new lesions. He unfortunately has some dysphagia -- we will start with an UGI esophagram to assess and I will call him with these results.     Shannon Schmidt, DO  Thoracic & Esophageal Surgery     "

## 2024-10-29 ENCOUNTER — HOSPITAL ENCOUNTER (OUTPATIENT)
Dept: RADIOLOGY | Facility: HOSPITAL | Age: 79
Discharge: HOME | End: 2024-10-29
Payer: MEDICARE

## 2024-10-29 DIAGNOSIS — R13.10 DYSPHAGIA, UNSPECIFIED TYPE: ICD-10-CM

## 2024-10-29 PROCEDURE — A9698 NON-RAD CONTRAST MATERIALNOC: HCPCS | Performed by: STUDENT IN AN ORGANIZED HEALTH CARE EDUCATION/TRAINING PROGRAM

## 2024-10-29 PROCEDURE — 74220 X-RAY XM ESOPHAGUS 1CNTRST: CPT

## 2024-10-29 PROCEDURE — 2500000001 HC RX 250 WO HCPCS SELF ADMINISTERED DRUGS (ALT 637 FOR MEDICARE OP): Performed by: STUDENT IN AN ORGANIZED HEALTH CARE EDUCATION/TRAINING PROGRAM

## 2024-10-29 PROCEDURE — 2500000005 HC RX 250 GENERAL PHARMACY W/O HCPCS: Performed by: STUDENT IN AN ORGANIZED HEALTH CARE EDUCATION/TRAINING PROGRAM

## 2024-10-30 ENCOUNTER — TELEPHONE (OUTPATIENT)
Dept: CARDIOTHORACIC SURGERY | Facility: HOSPITAL | Age: 79
End: 2024-10-30
Payer: COMMERCIAL

## 2024-10-30 ENCOUNTER — PREP FOR PROCEDURE (OUTPATIENT)
Dept: CARDIOTHORACIC SURGERY | Facility: HOSPITAL | Age: 79
End: 2024-10-30
Payer: COMMERCIAL

## 2024-10-30 ENCOUNTER — OFFICE VISIT (OUTPATIENT)
Dept: GASTROENTEROLOGY | Facility: CLINIC | Age: 79
End: 2024-10-30
Payer: MEDICARE

## 2024-10-30 VITALS
HEIGHT: 71 IN | BODY MASS INDEX: 29.26 KG/M2 | HEART RATE: 86 BPM | SYSTOLIC BLOOD PRESSURE: 142 MMHG | TEMPERATURE: 97.7 F | DIASTOLIC BLOOD PRESSURE: 82 MMHG | WEIGHT: 209 LBS

## 2024-10-30 DIAGNOSIS — K59.09 CHRONIC CONSTIPATION: Primary | ICD-10-CM

## 2024-10-30 DIAGNOSIS — K22.2 ESOPHAGEAL STRICTURE: Primary | ICD-10-CM

## 2024-10-30 DIAGNOSIS — R13.19 ESOPHAGEAL DYSPHAGIA: ICD-10-CM

## 2024-10-30 PROCEDURE — 99213 OFFICE O/P EST LOW 20 MIN: CPT | Performed by: INTERNAL MEDICINE

## 2024-10-30 PROCEDURE — 3079F DIAST BP 80-89 MM HG: CPT | Performed by: INTERNAL MEDICINE

## 2024-10-30 PROCEDURE — 3077F SYST BP >= 140 MM HG: CPT | Performed by: INTERNAL MEDICINE

## 2024-10-30 RX ORDER — TADALAFIL 20 MG/1
20 TABLET ORAL
COMMUNITY
Start: 2023-10-18

## 2024-10-30 RX ORDER — CHOLECALCIFEROL (VITAMIN D3) 25 MCG
1000 TABLET ORAL
COMMUNITY

## 2024-10-30 RX ORDER — AMITRIPTYLINE HYDROCHLORIDE 10 MG/1
30 TABLET, FILM COATED ORAL
COMMUNITY
Start: 2024-10-29

## 2024-10-30 RX ORDER — DEXTROMETHORPHAN HYDROBROMIDE, GUAIFENESIN 5; 100 MG/5ML; MG/5ML
1300 LIQUID ORAL EVERY 8 HOURS PRN
COMMUNITY

## 2024-11-06 ENCOUNTER — ANESTHESIA EVENT (OUTPATIENT)
Dept: OPERATING ROOM | Facility: HOSPITAL | Age: 79
End: 2024-11-06
Payer: MEDICARE

## 2024-11-06 ENCOUNTER — ANESTHESIA (OUTPATIENT)
Dept: OPERATING ROOM | Facility: HOSPITAL | Age: 79
End: 2024-11-06
Payer: MEDICARE

## 2024-11-06 ENCOUNTER — HOSPITAL ENCOUNTER (OUTPATIENT)
Facility: HOSPITAL | Age: 79
Setting detail: OUTPATIENT SURGERY
Discharge: HOME | End: 2024-11-06
Attending: STUDENT IN AN ORGANIZED HEALTH CARE EDUCATION/TRAINING PROGRAM | Admitting: STUDENT IN AN ORGANIZED HEALTH CARE EDUCATION/TRAINING PROGRAM
Payer: MEDICARE

## 2024-11-06 ENCOUNTER — APPOINTMENT (OUTPATIENT)
Dept: RADIOLOGY | Facility: HOSPITAL | Age: 79
End: 2024-11-06
Payer: MEDICARE

## 2024-11-06 VITALS
WEIGHT: 203.93 LBS | DIASTOLIC BLOOD PRESSURE: 85 MMHG | HEART RATE: 71 BPM | TEMPERATURE: 97.5 F | BODY MASS INDEX: 28.55 KG/M2 | OXYGEN SATURATION: 100 % | RESPIRATION RATE: 14 BRPM | HEIGHT: 71 IN | SYSTOLIC BLOOD PRESSURE: 132 MMHG

## 2024-11-06 DIAGNOSIS — K21.00 GASTROESOPHAGEAL REFLUX DISEASE WITH ESOPHAGITIS WITHOUT HEMORRHAGE: ICD-10-CM

## 2024-11-06 DIAGNOSIS — K22.2 ESOPHAGEAL STRICTURE: Primary | ICD-10-CM

## 2024-11-06 DIAGNOSIS — R13.19 ESOPHAGEAL DYSPHAGIA: ICD-10-CM

## 2024-11-06 LAB
ABO GROUP (TYPE) IN BLOOD: NORMAL
ANTIBODY SCREEN: NORMAL
GLUCOSE BLD MANUAL STRIP-MCNC: 112 MG/DL (ref 74–99)
RH FACTOR (ANTIGEN D): NORMAL

## 2024-11-06 PROCEDURE — 82947 ASSAY GLUCOSE BLOOD QUANT: CPT

## 2024-11-06 PROCEDURE — 7100000010 HC PHASE TWO TIME - EACH INCREMENTAL 1 MINUTE: Performed by: STUDENT IN AN ORGANIZED HEALTH CARE EDUCATION/TRAINING PROGRAM

## 2024-11-06 PROCEDURE — 71045 X-RAY EXAM CHEST 1 VIEW: CPT | Performed by: RADIOLOGY

## 2024-11-06 PROCEDURE — 43248 EGD GUIDE WIRE INSERTION: CPT | Performed by: STUDENT IN AN ORGANIZED HEALTH CARE EDUCATION/TRAINING PROGRAM

## 2024-11-06 PROCEDURE — 2500000004 HC RX 250 GENERAL PHARMACY W/ HCPCS (ALT 636 FOR OP/ED)

## 2024-11-06 PROCEDURE — 36415 COLL VENOUS BLD VENIPUNCTURE: CPT | Performed by: STUDENT IN AN ORGANIZED HEALTH CARE EDUCATION/TRAINING PROGRAM

## 2024-11-06 PROCEDURE — 2500000005 HC RX 250 GENERAL PHARMACY W/O HCPCS: Performed by: STUDENT IN AN ORGANIZED HEALTH CARE EDUCATION/TRAINING PROGRAM

## 2024-11-06 PROCEDURE — 88305 TISSUE EXAM BY PATHOLOGIST: CPT | Mod: TC,SUR | Performed by: STUDENT IN AN ORGANIZED HEALTH CARE EDUCATION/TRAINING PROGRAM

## 2024-11-06 PROCEDURE — 2720000007 HC OR 272 NO HCPCS: Performed by: STUDENT IN AN ORGANIZED HEALTH CARE EDUCATION/TRAINING PROGRAM

## 2024-11-06 PROCEDURE — 7100000009 HC PHASE TWO TIME - INITIAL BASE CHARGE: Performed by: STUDENT IN AN ORGANIZED HEALTH CARE EDUCATION/TRAINING PROGRAM

## 2024-11-06 PROCEDURE — 3600000008 HC OR TIME - EACH INCREMENTAL 1 MINUTE - PROCEDURE LEVEL THREE: Performed by: STUDENT IN AN ORGANIZED HEALTH CARE EDUCATION/TRAINING PROGRAM

## 2024-11-06 PROCEDURE — 71045 X-RAY EXAM CHEST 1 VIEW: CPT

## 2024-11-06 PROCEDURE — 3700000002 HC GENERAL ANESTHESIA TIME - EACH INCREMENTAL 1 MINUTE: Performed by: STUDENT IN AN ORGANIZED HEALTH CARE EDUCATION/TRAINING PROGRAM

## 2024-11-06 PROCEDURE — 7100000002 HC RECOVERY ROOM TIME - EACH INCREMENTAL 1 MINUTE: Performed by: STUDENT IN AN ORGANIZED HEALTH CARE EDUCATION/TRAINING PROGRAM

## 2024-11-06 PROCEDURE — 7100000001 HC RECOVERY ROOM TIME - INITIAL BASE CHARGE: Performed by: STUDENT IN AN ORGANIZED HEALTH CARE EDUCATION/TRAINING PROGRAM

## 2024-11-06 PROCEDURE — 86901 BLOOD TYPING SEROLOGIC RH(D): CPT | Performed by: STUDENT IN AN ORGANIZED HEALTH CARE EDUCATION/TRAINING PROGRAM

## 2024-11-06 PROCEDURE — 3700000001 HC GENERAL ANESTHESIA TIME - INITIAL BASE CHARGE: Performed by: STUDENT IN AN ORGANIZED HEALTH CARE EDUCATION/TRAINING PROGRAM

## 2024-11-06 PROCEDURE — C1769 GUIDE WIRE: HCPCS | Performed by: STUDENT IN AN ORGANIZED HEALTH CARE EDUCATION/TRAINING PROGRAM

## 2024-11-06 PROCEDURE — 3600000003 HC OR TIME - INITIAL BASE CHARGE - PROCEDURE LEVEL THREE: Performed by: STUDENT IN AN ORGANIZED HEALTH CARE EDUCATION/TRAINING PROGRAM

## 2024-11-06 RX ORDER — FENTANYL CITRATE 50 UG/ML
INJECTION, SOLUTION INTRAMUSCULAR; INTRAVENOUS
Status: COMPLETED
Start: 2024-11-06 | End: 2024-11-06

## 2024-11-06 RX ORDER — ROCURONIUM BROMIDE 10 MG/ML
INJECTION, SOLUTION INTRAVENOUS AS NEEDED
Status: DISCONTINUED | OUTPATIENT
Start: 2024-11-06 | End: 2024-11-06

## 2024-11-06 RX ORDER — LIDOCAINE HCL/PF 100 MG/5ML
SYRINGE (ML) INTRAVENOUS
Status: DISCONTINUED
Start: 2024-11-06 | End: 2024-11-06 | Stop reason: HOSPADM

## 2024-11-06 RX ORDER — LABETALOL HYDROCHLORIDE 5 MG/ML
5 INJECTION, SOLUTION INTRAVENOUS ONCE AS NEEDED
Status: DISCONTINUED | OUTPATIENT
Start: 2024-11-06 | End: 2024-11-06 | Stop reason: HOSPADM

## 2024-11-06 RX ORDER — NYSTATIN 100000 [USP'U]/ML
5 SUSPENSION ORAL 4 TIMES DAILY
Qty: 140 ML | Refills: 0 | Status: SHIPPED | OUTPATIENT
Start: 2024-11-06 | End: 2024-11-13

## 2024-11-06 RX ORDER — HYDROMORPHONE HYDROCHLORIDE 0.2 MG/ML
0.1 INJECTION INTRAMUSCULAR; INTRAVENOUS; SUBCUTANEOUS EVERY 5 MIN PRN
Status: DISCONTINUED | OUTPATIENT
Start: 2024-11-06 | End: 2024-11-06 | Stop reason: HOSPADM

## 2024-11-06 RX ORDER — PROPOFOL 10 MG/ML
INJECTION, EMULSION INTRAVENOUS
Status: COMPLETED
Start: 2024-11-06 | End: 2024-11-06

## 2024-11-06 RX ORDER — HYDROMORPHONE HYDROCHLORIDE 0.2 MG/ML
0.2 INJECTION INTRAMUSCULAR; INTRAVENOUS; SUBCUTANEOUS EVERY 5 MIN PRN
Status: DISCONTINUED | OUTPATIENT
Start: 2024-11-06 | End: 2024-11-06 | Stop reason: HOSPADM

## 2024-11-06 RX ORDER — FENTANYL CITRATE 50 UG/ML
INJECTION, SOLUTION INTRAMUSCULAR; INTRAVENOUS AS NEEDED
Status: DISCONTINUED | OUTPATIENT
Start: 2024-11-06 | End: 2024-11-06

## 2024-11-06 RX ORDER — ONDANSETRON HYDROCHLORIDE 2 MG/ML
INJECTION, SOLUTION INTRAVENOUS
Status: COMPLETED
Start: 2024-11-06 | End: 2024-11-06

## 2024-11-06 RX ORDER — LIDOCAINE HYDROCHLORIDE 10 MG/ML
0.1 INJECTION, SOLUTION INFILTRATION; PERINEURAL ONCE
Status: DISCONTINUED | OUTPATIENT
Start: 2024-11-06 | End: 2024-11-06 | Stop reason: HOSPADM

## 2024-11-06 RX ORDER — ONDANSETRON HYDROCHLORIDE 2 MG/ML
4 INJECTION, SOLUTION INTRAVENOUS ONCE AS NEEDED
Status: DISCONTINUED | OUTPATIENT
Start: 2024-11-06 | End: 2024-11-06 | Stop reason: HOSPADM

## 2024-11-06 RX ORDER — ONDANSETRON HYDROCHLORIDE 2 MG/ML
INJECTION, SOLUTION INTRAVENOUS AS NEEDED
Status: DISCONTINUED | OUTPATIENT
Start: 2024-11-06 | End: 2024-11-06

## 2024-11-06 RX ORDER — LIDOCAINE HYDROCHLORIDE 20 MG/ML
INJECTION, SOLUTION INFILTRATION; PERINEURAL AS NEEDED
Status: DISCONTINUED | OUTPATIENT
Start: 2024-11-06 | End: 2024-11-06

## 2024-11-06 RX ORDER — HYDROMORPHONE HYDROCHLORIDE 1 MG/ML
0.5 INJECTION, SOLUTION INTRAMUSCULAR; INTRAVENOUS; SUBCUTANEOUS EVERY 5 MIN PRN
Status: DISCONTINUED | OUTPATIENT
Start: 2024-11-06 | End: 2024-11-06 | Stop reason: HOSPADM

## 2024-11-06 RX ORDER — ROCURONIUM BROMIDE 10 MG/ML
INJECTION, SOLUTION INTRAVENOUS
Status: COMPLETED
Start: 2024-11-06 | End: 2024-11-06

## 2024-11-06 RX ORDER — PROPOFOL 10 MG/ML
INJECTION, EMULSION INTRAVENOUS AS NEEDED
Status: DISCONTINUED | OUTPATIENT
Start: 2024-11-06 | End: 2024-11-06

## 2024-11-06 RX ORDER — PANTOPRAZOLE SODIUM 40 MG/1
40 TABLET, DELAYED RELEASE ORAL 2 TIMES DAILY
Qty: 60 TABLET | Refills: 2 | Status: SHIPPED | OUTPATIENT
Start: 2024-11-06 | End: 2025-02-04

## 2024-11-06 RX ORDER — SODIUM CHLORIDE, SODIUM LACTATE, POTASSIUM CHLORIDE, CALCIUM CHLORIDE 600; 310; 30; 20 MG/100ML; MG/100ML; MG/100ML; MG/100ML
100 INJECTION, SOLUTION INTRAVENOUS CONTINUOUS
Status: DISCONTINUED | OUTPATIENT
Start: 2024-11-06 | End: 2024-11-06 | Stop reason: HOSPADM

## 2024-11-06 SDOH — HEALTH STABILITY: MENTAL HEALTH: CURRENT SMOKER: 0

## 2024-11-06 ASSESSMENT — PAIN SCALES - GENERAL
PAINLEVEL_OUTOF10: 0 - NO PAIN

## 2024-11-06 ASSESSMENT — COLUMBIA-SUICIDE SEVERITY RATING SCALE - C-SSRS
1. IN THE PAST MONTH, HAVE YOU WISHED YOU WERE DEAD OR WISHED YOU COULD GO TO SLEEP AND NOT WAKE UP?: NO
6. HAVE YOU EVER DONE ANYTHING, STARTED TO DO ANYTHING, OR PREPARED TO DO ANYTHING TO END YOUR LIFE?: NO
2. HAVE YOU ACTUALLY HAD ANY THOUGHTS OF KILLING YOURSELF?: NO

## 2024-11-06 ASSESSMENT — PAIN - FUNCTIONAL ASSESSMENT
PAIN_FUNCTIONAL_ASSESSMENT: 0-10

## 2024-11-06 NOTE — ANESTHESIA PREPROCEDURE EVALUATION
Patient: Kody Conway    Procedure Information       Date/Time: 11/06/24 0920    Procedure: Esophagogastroduodenoscopy with Dilatation (Abdomen) - EGD DILATION    [facility]    Surgeons: Shannon Schmidt DO          78 yo M presenting for esophageal dilation for distal esophageal stricture.    PMH CAD s/p stents ~2010 per patient  Patient was recently seen at [facility] for sharp chest pain - per chart review, chest pain atypical and not likely cardiac. Patient seen in [place] (lives there >50% of the year) and had a angiogram without need for intervention in June 2024.  Patient confirms chest pain is sharp in nature and not like the pain that he previously had when he had a PCI.    Relevant Problems   Cardiac   (+) Atherosclerotic heart disease of native coronary artery without angina pectoris   (+) Benign essential hypertension   (+) Hyperlipidemia      Pulmonary   (+) COPD (chronic obstructive pulmonary disease) (Multi)   (+) Lung cancer (Multi)   (+) Obstructive sleep apnea of adult      GI   (+) Esophageal reflux   (+) Esophageal stricture      /Renal   (+) BPH with obstruction/lower urinary tract symptoms      Endocrine   (+) Type 2 diabetes mellitus with chronic kidney disease, without long-term current use of insulin, unspecified CKD stage (Multi)      Musculoskeletal   (+) Lumbar spinal stenosis   (+) Osteoarthritis of knee      HEENT   (+) Open-angle glaucoma of left eye, severe stage       Clinical information reviewed:                   NPO Detail:  No data recorded     Physical Exam    Airway  Mallampati: III  TM distance: >3 FB  Neck ROM: full     Cardiovascular   Rhythm: regular  Rate: normal     Dental    Pulmonary    Abdominal      Other findings: Upper plate - removal  Lower partial          Anesthesia Plan    History of general anesthesia?: yes  History of complications of general anesthesia?: no    ASA 3     general     The patient is not a current  smoker.    intravenous induction   Postoperative administration of opioids is intended.  Trial extubation is planned.  Anesthetic plan and risks discussed with patient.  Use of blood products discussed with patient who consented to blood products.    Plan discussed with resident.

## 2024-11-06 NOTE — INTERVAL H&P NOTE
H&P reviewed. The patient was examined and there are no changes to the H&P.  Here for EGD dilation.     No new fever, chills, chest pain, dyspnea.    To OR   Home from PACU

## 2024-11-06 NOTE — ANESTHESIA PROCEDURE NOTES
Airway  Date/Time: 11/6/2024 9:47 AM  Urgency: elective    Airway not difficult    Staffing  Performed: resident   Authorized by: Tika Ambrose MD    Performed by: Ravindra Zhang MD  Patient location during procedure: OR    Indications and Patient Condition  Indications for airway management: anesthesia  Spontaneous ventilation: present  Sedation level: deep  Preoxygenated: yes  Patient position: sniffing  MILS maintained throughout  Mask difficulty assessment: 1 - vent by mask  Planned trial extubation    Final Airway Details  Final airway type: endotracheal airway      Successful airway: ETT  Cuffed: yes   Successful intubation technique: direct laryngoscopy  Facilitating devices/methods: intubating stylet  Endotracheal tube insertion site: oral  Blade: Jacque  Blade size: #4  ETT size (mm): 7.5  Cormack-Lehane Classification: grade I - full view of glottis  Placement verified by: chest auscultation   Inital cuff pressure (cm H2O): 8  Measured from: lips  ETT to lips (cm): 21  Number of attempts at approach: 1  Number of other approaches attempted: 0

## 2024-11-06 NOTE — BRIEF OP NOTE
Date: 2024  OR Location: [facility] OR    Name: Kody Conway, : 1945, Age: 79 y.o., MRN: [id number], Sex: male    Diagnosis  Pre-op Diagnosis      * Esophageal stricture [K22.2] Post-op Diagnosis     * Esophageal stricture [K22.2]     Procedures  Esophagogastroduodenoscopy with Dilatation  39442 - NE EGD BALLOON DILATION ESOPHAGUS <30 MM DIAM  EGD with bougie dilation  GEJ bx, gastric biopsies (cold)    Surgeons      * Shannon Schmidt - Primary    Resident/Fellow/Other Assistant:  Surgeons and Role:     * Galo Morrissey MD - Fellow    Staff:   Circulator: Meng  Scrub Person: Pasha Pinedo Person: Adrienne    Anesthesia Staff: Anesthesiologist: Tika Ambrose MD  Anesthesia Resident: Ravindra Zhang MD    Procedure Summary  Anesthesia: General  ASA: III  Estimated Blood Loss: 2mL  Intra-op Medications:   Administrations occurring from 0920 to 1025 on 24:   Medication Name Total Dose   surgical lubricant gel 1 Application   fentaNYL PF (Sublimaze) injection  - Omnicell Override Pull Cannot be calculated   fentaNYL PF (Sublimaze) injection  - Omnicell Override Pull Cannot be calculated   ondansetron (Zofran) injection  - Omnicell Override Pull Cannot be calculated   propofol (Diprivan) injection  - Omnicell Override Pull Cannot be calculated   rocuronium (ZeMuron) injection  - Omnicell Override Pull Cannot be calculated   sugammadex (Bridion) injection  - Omnicell Override Pull Cannot be calculated   dexAMETHasone (Decadron) 4 mg/mL 8 mg   fentaNYL (Sublimaze) injection 50 mcg/mL 150 mcg   LR bolus Cannot be calculated   lidocaine (Xylocaine) injection 2 % 100 mg   ondansetron 2 mg/mL 4 mg   propofol (Diprivan) injection 10 mg/mL 140 mg   rocuronium (ZeMuron) 50 mg/5 mL injection 80 mg   sugammadex (Bridion) 200 mg/2 mL injection 400 mg              Anesthesia Record               Intraprocedure I/O Totals       None           Specimen:   ID Type Source Tests Collected by Time   1 : GASTRIC BODY  BIOPSY Tissue GASTRIC BODY BIOPSY SURGICAL PATHOLOGY EXAM Shannon Schmidt, DO 11/6/2024 0954   2 : GASTROESOPHAGEAL JUNCTION @39 CM Tissue ESOPHAGOGASTRIC JUNCTION BIOPSY SURGICAL PATHOLOGY EXAM Shannon Schmidt, DO 11/6/2024 1014                  Findings: Inflammation at GEJ, biopsied for path. Multiple nodules/polyps within the gastric body that were biopsied. ? Candida like plaques. Bougie dilation performed.     Complications:  None; patient tolerated the procedure well.     Disposition: PACU - hemodynamically stable.  Condition: stable  Specimens Collected:   ID Type Source Tests Collected by Time   1 : GASTRIC BODY BIOPSY Tissue GASTRIC BODY BIOPSY SURGICAL PATHOLOGY EXAM Shannon Schmidt, DO 11/6/2024 0954   2 : GASTROESOPHAGEAL JUNCTION @39 CM Tissue ESOPHAGOGASTRIC JUNCTION BIOPSY SURGICAL PATHOLOGY EXAM Shannon Schmidt, DO 11/6/2024 1014     Attending Attestation:     Shannon Schmidt  Phone Number: [phone]

## 2024-11-06 NOTE — DISCHARGE INSTRUCTIONS
Can resume full liquid diet today. Advance to regular diet tomorrow.     Start taking twice daily PPI (pantoprazole for acid control)  Start taking Nystatin swish/swallow for 7days, 4x/day.    No acitivity restrictions.     Follow up with Dr. Schmidt in 2 weeks to review biopsy results.

## 2024-11-06 NOTE — ANESTHESIA POSTPROCEDURE EVALUATION
Patient: Kody Conway    Procedure Summary       Date: 11/06/24 Room / Location: [facility] OR 14 / [facility] OR    Anesthesia Start: 0939 Anesthesia Stop: 1035    Procedure: Esophagogastroduodenoscopy with Dilatation (Abdomen) Diagnosis:       Esophageal stricture      (Esophageal stricture [K22.2])    Surgeons: Shannon Schmidt DO Responsible Provider: Tika Ambrose MD    Anesthesia Type: general ASA Status: 3            Anesthesia Type: general    Vitals Value Taken Time   /75 11/06/24 1031   Temp 35.5 11/06/24 1035   Pulse 71 11/06/24 1034   Resp 9 11/06/24 1034   SpO2 100 % 11/06/24 1034   Vitals shown include unfiled device data.    Anesthesia Post Evaluation    Patient location during evaluation: PACU  Patient participation: complete - patient participated  Level of consciousness: awake and alert  Pain management: adequate  Airway patency: patent  Cardiovascular status: acceptable  Respiratory status: acceptable  Hydration status: acceptable  Postoperative Nausea and Vomiting: none        There were no known notable events for this encounter.

## 2024-11-06 NOTE — OP NOTE
Esophagogastroduodenoscopy with Dilatation Operative Note     Date: 2024  OR Location: [facility] OR    Name: Kody Conway, : 1945, Age: 79 y.o., MRN: [id number], Sex: male    Diagnosis  Pre-op Diagnosis      * Esophageal stricture [K22.2] Post-op Diagnosis     * Esophageal stricture [K22.2]     Procedures  Esophagogastroduodenoscopy with Dilatation (Savary), gastric biopsies      Surgeons      * Shannon Schmidt - Primary    Resident/Fellow/Other Assistant:  Surgeons and Role:     * Galo Morrissey MD - Fellow    Staff:   Circulator: Meng Trivediub Person: Pasha Pinedo Person: Adrienne    Anesthesia Staff: Anesthesiologist: Tiak Ambrose MD  Anesthesia Resident: Ravindra Zhang MD    Procedure Summary  Anesthesia: General  ASA: III  Estimated Blood Loss: 5mL  Intra-op Medications:   Administrations occurring from 0920 to 1025 on 24:   Medication Name Total Dose   surgical lubricant gel 1 Application   fentaNYL PF (Sublimaze) injection  - Omnicell Override Pull Cannot be calculated   fentaNYL PF (Sublimaze) injection  - Omnicell Override Pull Cannot be calculated   ondansetron (Zofran) injection  - Omnicell Override Pull Cannot be calculated   propofol (Diprivan) injection  - Omnicell Override Pull Cannot be calculated   rocuronium (ZeMuron) injection  - Omnicell Override Pull Cannot be calculated   sugammadex (Bridion) injection  - Omnicell Override Pull Cannot be calculated   dexAMETHasone (Decadron) 4 mg/mL 8 mg   fentaNYL (Sublimaze) injection 50 mcg/mL 150 mcg   LR bolus Cannot be calculated   lidocaine (Xylocaine) injection 2 % 100 mg   ondansetron 2 mg/mL 4 mg   propofol (Diprivan) injection 10 mg/mL 140 mg   rocuronium (ZeMuron) 50 mg/5 mL injection 80 mg   sugammadex (Bridion) 200 mg/2 mL injection 400 mg              Anesthesia Record               Intraprocedure I/O Totals       None           Specimen:   ID Type Source Tests Collected by Time   1 : GASTRIC BODY BIOPSY Tissue GASTRIC  BODY BIOPSY SURGICAL PATHOLOGY EXAM Shannon Schmidt, DO 11/6/2024 0954   2 : GASTROESOPHAGEAL JUNCTION @39 CM Tissue ESOPHAGOGASTRIC JUNCTION BIOPSY SURGICAL PATHOLOGY EXAM Shannon Schmidt, DO 11/6/2024 1014                 Drains and/or Catheters: * None in log *    Tourniquet Times:         Implants:     Findings: Evidence of thrush; mild stricture at GE junction. Nodularity of cardia/fundus and proximal gastric body    Indications: Kody Conway is an 79 y.o. male who is having surgery for Esophageal stricture [K22.2].  Patient has been followed for lung cancer, during his last visit he endorsed worsening dysphagia which has been present for several weeks.  No significant weight loss.  He had an esophagram showing a distal esophageal stricture/Schatzki ring.  We discussed EGD for dilation of this and patient was agreeable.    The patient was seen in the preoperative area. The risks, benefits, complications, treatment options, non-operative alternatives, expected recovery and outcomes were discussed with the patient. The possibilities of reaction to medication, pulmonary aspiration, injury to surrounding structures, bleeding, recurrent infection, the need for additional procedures, failure to diagnose a condition, and creating a complication requiring transfusion or operation were discussed with the patient. The patient concurred with the proposed plan, giving informed consent.  The site of surgery was properly noted/marked if necessary per policy. The patient has been actively warmed in preoperative area. Preoperative antibiotics are not indicated. Venous thrombosis prophylaxis have been ordered including bilateral sequential compression devices    Procedure Details: Patient was brought into the operating suite and procedural information was confirmed.  General anesthesia was induced and a single-lumen endotracheal tube was placed.  Endoscope was inserted and advanced into the proximal esophagus.  There was  evidence of thrush in the proximal and mid esophagus.  Distally there was a mild stricture but this was traversable with the endoscope.  Within the proximal stomach: Fundus, cardia, and proximal body there was nodularity of the stomach wall which appeared thickened.  There were some slightly concerning appearing polyps within this which were biopsied and sent for permanent pathology.  The pylorus was traversable and the duodenum was normal.  We pulled the endoscope back into the stomach and passed a wire under visualization.  The scope was then removed and savory dilation was performed starting at 12 mm serially up to 16 mm without significant resistance passing the dilators.  Endoscope was then replaced, there was no obvious mucosal defect or injury.  Stomach appeared unchanged.  With as we pulled back the scope there was also some nodularity at the GE junction, additional biopsy was obtained here at 39 cm.  The endoscope was then removed and patient was extubated.  He was taken to PACU in stable condition.    Complications:  None; patient tolerated the procedure well.    Disposition: PACU - hemodynamically stable.  Condition: stable                 Additional Details: n/a    Attending Attestation: I was present for the entire procedure.    Shannon Schmidt  Phone Number: [phone]

## 2024-11-11 LAB
LABORATORY COMMENT REPORT: NORMAL
PATH REPORT.FINAL DX SPEC: NORMAL
PATH REPORT.GROSS SPEC: NORMAL
PATH REPORT.RELEVANT HX SPEC: NORMAL
PATH REPORT.TOTAL CANCER: NORMAL

## 2024-11-14 ENCOUNTER — OFFICE VISIT (OUTPATIENT)
Dept: SURGERY | Facility: HOSPITAL | Age: 79
End: 2024-11-14
Payer: MEDICARE

## 2024-11-14 VITALS
DIASTOLIC BLOOD PRESSURE: 81 MMHG | WEIGHT: 203 LBS | SYSTOLIC BLOOD PRESSURE: 144 MMHG | OXYGEN SATURATION: 100 % | HEART RATE: 75 BPM | BODY MASS INDEX: 28.31 KG/M2 | TEMPERATURE: 97.5 F

## 2024-11-14 DIAGNOSIS — E11.22 TYPE 2 DIABETES MELLITUS WITH CHRONIC KIDNEY DISEASE, WITHOUT LONG-TERM CURRENT USE OF INSULIN, UNSPECIFIED CKD STAGE (MULTI): ICD-10-CM

## 2024-11-14 DIAGNOSIS — M79.89 FOOT SWELLING: ICD-10-CM

## 2024-11-14 PROCEDURE — 3077F SYST BP >= 140 MM HG: CPT | Performed by: STUDENT IN AN ORGANIZED HEALTH CARE EDUCATION/TRAINING PROGRAM

## 2024-11-14 PROCEDURE — 1126F AMNT PAIN NOTED NONE PRSNT: CPT | Performed by: STUDENT IN AN ORGANIZED HEALTH CARE EDUCATION/TRAINING PROGRAM

## 2024-11-14 PROCEDURE — 3079F DIAST BP 80-89 MM HG: CPT | Performed by: STUDENT IN AN ORGANIZED HEALTH CARE EDUCATION/TRAINING PROGRAM

## 2024-11-14 PROCEDURE — 99211 OFF/OP EST MAY X REQ PHY/QHP: CPT | Performed by: STUDENT IN AN ORGANIZED HEALTH CARE EDUCATION/TRAINING PROGRAM

## 2024-11-14 ASSESSMENT — PAIN SCALES - GENERAL: PAINLEVEL_OUTOF10: 0-NO PAIN

## 2024-11-14 NOTE — PATIENT INSTRUCTIONS
"Follow up in 1 year with CT chest prior. See appointment below in \"What's Next\".  Call our office with any questions. [phone]   "

## 2024-11-18 NOTE — PROGRESS NOTES
C/C:  Post op visit      History Of Present Illness  Kody Conway is a 79 y.o. male presenting for his first post-procedure visit after undergoing an EGD dilation on 11/6/24 for dysphagia with an esophageal stricture found during follow up  with surveillance CT chest given h/o lung cancer and smoking hx.     He is feeling much better today. Denies any chest pain, SOB. No ongoing dysphagia other concerns today. Spends the majority of the year in [facility] but travels to [place] for ~5 months/year. Pt denies any significant change in his baseline SANTOS (mild; also has DUDLEY, not using CPAP).     By way of review: Patient underwent a right upper lobectomy on 1/27/2010 with Dr. Kincaid.  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  He has been followed for this reason with surveillance imaging.  He has a heavy smoking history, Still smokes a cigarette on occasion..    Past Medical History  He has a past medical history of Atherosclerotic heart disease of native coronary artery without angina pectoris (03/26/2013), Contusion of left elbow, initial encounter (07/29/2019), Disorder of kidney and ureter, unspecified (04/27/2018), Disturbances of salivary secretion (05/05/2017), Dyspnea, unspecified, Dyspnea, unspecified (02/28/2018), Edema, unspecified, Encounter for screening for malignant neoplasm of prostate (06/03/2020), Encounter for screening for malignant neoplasm of prostate (05/24/2021), Essential (primary) hypertension (03/26/2013), Fracture of one rib, unspecified side, initial encounter for closed fracture (07/02/2013), Hypocalcemia (07/01/2020), Male erectile dysfunction, unspecified (06/13/2018), Male erectile dysfunction, unspecified (03/26/2013), Malignant (primary) neoplasm, unspecified (Multi), Other chest pain (07/31/2019), Other conditions influencing health status, Other hypersomnia (05/05/2017), Other shoulder lesions, right  shoulder (09/17/2020), Other specified diseases of anus and rectum (05/26/2017), Other specified diseases of intestine (05/19/2021), Other symptoms and signs involving the nervous system (07/29/2021), Other tear of medial meniscus, current injury, left knee, initial encounter (11/18/2015), Pain in right knee (07/31/2018), Personal history of diseases of the skin and subcutaneous tissue (10/02/2013), Personal history of other (healed) physical injury and trauma (10/29/2014), Personal history of other diseases of male genital organs (02/02/2015), Personal history of other diseases of male genital organs, Personal history of other diseases of male genital organs (07/07/2021), Personal history of other diseases of male genital organs (02/21/2022), Personal history of other diseases of the circulatory system, Personal history of other diseases of the circulatory system, Personal history of other diseases of the digestive system, Personal history of other diseases of the musculoskeletal system and connective tissue (08/14/2018), Personal history of other diseases of the respiratory system (06/03/2020), Personal history of other diseases of the respiratory system (06/03/2020), Personal history of other diseases of the respiratory system (03/01/2016), Personal history of other diseases of the respiratory system (09/14/2016), Personal history of other endocrine, nutritional and metabolic disease (02/25/2022), Personal history of other endocrine, nutritional and metabolic disease, Personal history of other specified conditions (07/19/2016), Personal history of other specified conditions (10/26/2015), Personal history of other specified conditions (01/29/2020), Personal history of other specified conditions (06/15/2021), Personal history of other specified conditions (04/27/2018), Personal history of other specified conditions (10/04/2017), Personal history of urinary (tract) infections (10/27/2017), Radial styloid  tenosynovitis (de quervain) (03/20/2018), Right lower quadrant pain (08/20/2018), Trigger finger, left middle finger (03/20/2018), Unspecified abdominal hernia without obstruction or gangrene (09/20/2013), and Unspecified visual loss.    Social History  He reports that he has quit smoking. His smoking use included cigarettes. He has been exposed to tobacco smoke. He has never used smokeless tobacco. He reports that he does not drink alcohol and does not use drugs.      Medications    Current Outpatient Medications:     acetaminophen (Tylenol 8 HOUR) 650 mg ER tablet, Take 2 tablets (1,300 mg) by mouth every 8 hours if needed., Disp: , Rfl:     allopurinol (Zyloprim) 100 mg tablet, Take 1 tablet (100 mg) by mouth once daily., Disp: , Rfl:     amitriptyline (Elavil) 10 mg tablet, Take 3 tablets (30 mg) by mouth., Disp: , Rfl:     aspirin 81 mg EC tablet, Take 1 tablet (81 mg) by mouth once daily., Disp: , Rfl:     atorvastatin (Lipitor) 80 mg tablet, TAKE 1 TABLET BY MOUTH DAILY, Disp: 90 tablet, Rfl: 3    cholecalciferol (Vitamin D-3) 25 MCG (1000 UT) tablet, Take 1 tablet (1,000 Units) by mouth once daily., Disp: , Rfl:     cyclobenzaprine (Flexeril) 10 mg tablet, 1 tab(s) orally 3 times a day, Disp: , Rfl:     dicyclomine (Bentyl) 10 mg capsule, , Disp: , Rfl:     esomeprazole (NexIUM) 40 mg DR capsule, 1 cap(s) orally once a day, Disp: , Rfl:     ezetimibe (Zetia) 10 mg tablet, Take 1 tablet (10 mg) by mouth once daily. (Patient not taking: Reported on 10/30/2024), Disp: , Rfl:     fluticasone (Flonase) 50 mcg/actuation nasal spray, , Disp: , Rfl:     Gemtesa 75 mg tablet, Take 1 tablet (75 mg) by mouth once daily., Disp: , Rfl:     hydroCHLOROthiazide (HYDRODiuril) 12.5 mg tablet, Take 1 tablet (12.5 mg) by mouth once daily., Disp: , Rfl:     Linzess 145 mcg capsule, , Disp: , Rfl:     losartan (Cozaar) 25 mg tablet, , Disp: , Rfl:     nebivolol (Bystolic) 10 mg tablet, TAKE 1 TABLET BY MOUTH EVERY DAY, Disp: 90  tablet, Rfl: 1    nebivolol (Bystolic) 10 mg tablet, TAKE 1 TABLET BY MOUTH EVERY DAY, Disp: 90 tablet, Rfl: 1    pantoprazole (ProtoNix) 40 mg EC tablet, Take 1 tablet (40 mg) by mouth 2 times a day. Do not crush, chew, or split., Disp: 60 tablet, Rfl: 2    tadalafil 20 mg tablet, Take 1 tablet (20 mg) by mouth., Disp: , Rfl:     tamsulosin (Flomax) 0.4 mg 24 hr capsule, , Disp: , Rfl:     Trelegy Ellipta 100-62.5-25 mcg blister with device, Inhale 1 puff once daily., Disp: 1 each, Rfl: 6    Allergies  Ciprofloxacin    Review of Systems:  Review of Systems   Constitutional: No fevers, chills, unexpected weight change  HENT: No sore throat, congestion, or nasal drainage  Eyes: No visual changes or eye itching  Respiratory:  see HPI. No cough, worsening dyspnea, wheezing  Cardiac: No chest pain, palpitations, or lower extremity edema  Gastrointestinal: No nausea, vomiting, diarrhea. No abdominal pain  Genitourinary: No dysuria or hematuria  Musculoskeletal: No back pain. No significant myalgias or arthralgias  Neurologic: No headaches, dizziness, or seizures.  Hematologic: No east bleeding or bruising.  Psychiatric: No anxiety or depression.    Physical Exam:  Physical Exam  /81 (BP Location: Right arm, Patient Position: Sitting, BP Cuff Size: Large adult)   Pulse 75   Temp 36.4 °C (97.5 °F) (Temporal)   Wt 92.1 kg (203 lb)   SpO2 100%   BMI 28.31 kg/m²   Neurological:      General: No focal deficit present.      Mental Status: Patient is alert and oriented to person, place, and time.   Psychiatric:         Mood and Affect: Mood normal.       Relevant Results:     Pathology  Component    FINAL DIAGNOSIS   A.  STOMACH, BIOPSIES:  - MILD CHRONIC NONSPECIFIC GASTRITIS AND PROTON PUMP INHIBITOR EFFECT, NO HELICOBACTER IDENTIFIED.     B.  GASTROESOPHAGEAL JUNCTION AT 39 CM, BIOPSY:  - SQUAMOUS MUCOSA DEMONSTRATING HISTOPATHOLOGICAL FEATURES CONSISTENT WITH HEALED EROSION/ULCER.  - CARDIAC TYPE GASTRIC MUCOSA  "WITH NO SIGNIFICANT HISTOPATHOLOGICAL ABNORMALITIES.   Electronically signed by Torres Hoyt MD on 11/11/2024 at 1208       Imaging:    === 10/17/24 ===    CT CHEST WO IV CONTRAST    - Impression -  1.  Stable postsurgical changes of right upper lobectomy without  evidence of local recurrence or new metastatic disease.  2. Moderate apically predominant centrilobular emphysema.  3. Severe coronary artery calcifications. Please note the study is  not optimized for evaluation of the coronary arteries.  4. Prominent aortic valve calcifications, correlate with concern for  aortic stenosis.  5. Additional findings as above.    I personally reviewed the image(s)/study and resident interpretation.  I agree with the findings as stated by resident Gabriel Hampton.  Data analyzed and images interpreted at [facility], [place], [place].    MACRO:  None    Signed by: Darrick Ford 10/17/2024 4:31 PM  Dictation workstation:   [id number]      Pulmonary Functions Testing Results:    No results found for: \"FEV1\", \"FVC\", \"OBU0UJW\", \"TLC\", \"DLCO\"      Assessment/Plan   Diagnoses and all orders for this visit:  Type 2 diabetes mellitus with chronic kidney disease, without long-term current use of insulin, unspecified CKD stage (Multi)  -     Referral to Podiatry; Future  -     CT chest wo IV contrast; Future  -     Referral to Endocrinology; Future  Foot swelling  -     Referral to Podiatry; Future  -     CT chest wo IV contrast; Future  -     Referral to Endocrinology; Future      Kody Conway is a 79 y.o. male s/p RULx in 2010 for lung cancer, long term prior smoker. Surveillance imaging today showed no evidence of disease. He is doing well from a respiratory standpoint and recent CT chest shows no new lesions. He is now s/p EGD with dilation on 11/6/24 for dysphagia - pathology negative. Will plan to get CT chest next year. He can call me sooner with any new issues.     Shannon Schmidt, " DO  Thoracic & Esophageal Surgery

## 2025-03-06 ENCOUNTER — APPOINTMENT (OUTPATIENT)
Dept: PRIMARY CARE | Facility: CLINIC | Age: 80
End: 2025-03-06
Payer: MEDICARE

## 2025-03-14 PROBLEM — N32.81 OAB (OVERACTIVE BLADDER): Status: ACTIVE | Noted: 2023-10-18

## 2025-03-14 PROBLEM — R35.1 NOCTURIA: Status: ACTIVE | Noted: 2022-03-23

## 2025-03-14 PROBLEM — E66.9 OBESITY: Status: ACTIVE | Noted: 2025-03-14

## 2025-03-14 PROBLEM — N39.0 URINARY TRACT INFECTION: Status: ACTIVE | Noted: 2025-03-14

## 2025-03-14 PROBLEM — C80.1 MALIGNANT NEOPLASM (MULTI): Status: ACTIVE | Noted: 2025-03-14

## 2025-03-14 PROBLEM — M75.81 TENDINITIS OF RIGHT ROTATOR CUFF: Status: ACTIVE | Noted: 2025-03-14

## 2025-03-14 PROBLEM — M62.838 SPASM OF MUSCLE: Status: ACTIVE | Noted: 2024-09-27

## 2025-03-14 PROBLEM — N52.03 COMBINED ARTERIAL INSUFFICIENCY AND CORPORO-VENOUS OCCLUSIVE ERECTILE DYSFUNCTION: Status: ACTIVE | Noted: 2022-03-23

## 2025-03-14 PROBLEM — S39.012A BACK STRAIN: Status: ACTIVE | Noted: 2025-03-14

## 2025-03-14 PROBLEM — N41.9 PROSTATITIS: Status: ACTIVE | Noted: 2025-03-14

## 2025-03-14 PROBLEM — R31.0 GROSS HEMATURIA: Status: ACTIVE | Noted: 2021-11-17

## 2025-03-14 PROBLEM — J20.9 ACUTE BRONCHITIS: Status: ACTIVE | Noted: 2025-03-14

## 2025-03-14 PROBLEM — M65.4 RADIAL STYLOID TENOSYNOVITIS OF LEFT HAND: Status: ACTIVE | Noted: 2025-03-14

## 2025-03-14 PROBLEM — R39.15 URINARY URGENCY: Status: ACTIVE | Noted: 2020-01-15

## 2025-03-14 PROBLEM — R40.0 DAYTIME SOMNOLENCE: Status: ACTIVE | Noted: 2025-03-14

## 2025-03-14 PROBLEM — R53.1 WEAKNESS: Status: ACTIVE | Noted: 2025-03-14

## 2025-03-14 PROBLEM — N20.0 BILATERAL KIDNEY STONES: Status: ACTIVE | Noted: 2022-03-23

## 2025-03-14 PROBLEM — M65.332 ACQUIRED TRIGGER FINGER OF LEFT MIDDLE FINGER: Status: ACTIVE | Noted: 2025-03-14

## 2025-03-14 PROBLEM — L98.9 INFLAMMATORY DERMATOSIS: Status: ACTIVE | Noted: 2025-03-14

## 2025-03-14 PROBLEM — I35.0 AORTIC VALVE STENOSIS: Status: ACTIVE | Noted: 2025-01-16

## 2025-03-14 PROBLEM — R39.12 WEAK URINARY STREAM: Status: ACTIVE | Noted: 2022-03-23

## 2025-03-14 PROBLEM — R13.10 DYSPHAGIA: Status: ACTIVE | Noted: 2025-03-14

## 2025-03-14 PROBLEM — H53.9 VISION DISORDER: Status: ACTIVE | Noted: 2025-03-14

## 2025-03-21 ENCOUNTER — APPOINTMENT (OUTPATIENT)
Dept: ENDOCRINOLOGY | Facility: CLINIC | Age: 80
End: 2025-03-21
Payer: COMMERCIAL

## 2025-05-12 ENCOUNTER — APPOINTMENT (OUTPATIENT)
Dept: PRIMARY CARE | Facility: CLINIC | Age: 80
End: 2025-05-12
Payer: MEDICARE

## 2025-06-06 ENCOUNTER — APPOINTMENT (OUTPATIENT)
Dept: PRIMARY CARE | Facility: CLINIC | Age: 80
End: 2025-06-06
Payer: MEDICARE

## 2025-07-22 ENCOUNTER — APPOINTMENT (OUTPATIENT)
Dept: ENDOCRINOLOGY | Facility: CLINIC | Age: 80
End: 2025-07-22
Payer: MEDICARE

## (undated) DEVICE — BOWL, BASIN, 32 OZ, STERILE

## (undated) DEVICE — Device

## (undated) DEVICE — TUBING, SUCTION, CONNECTING, STERILE 0.25 X 120 IN., LF

## (undated) DEVICE — ADAPTER, LUER STUB, 16 G

## (undated) DEVICE — COVER, TABLE, 44 X 75 IN, DISPOSABLE, LF, STERILE

## (undated) DEVICE — REST, HEAD, BAGEL, 9 IN

## (undated) DEVICE — COVER, CART, 45 X 27 X 48 IN, CLEAR

## (undated) DEVICE — ADAPTER, WATER BOTTLE, SMART CAP, 140/160/180 SERIES

## (undated) DEVICE — SYRINGE, 35 CC, LUER LOCK, MONOJECT, W/O CAP, LF

## (undated) DEVICE — MANIFOLD, 4 PORT NEPTUNE STANDARD